# Patient Record
Sex: FEMALE | Race: WHITE | NOT HISPANIC OR LATINO | Employment: FULL TIME | URBAN - METROPOLITAN AREA
[De-identification: names, ages, dates, MRNs, and addresses within clinical notes are randomized per-mention and may not be internally consistent; named-entity substitution may affect disease eponyms.]

---

## 2021-03-30 ENCOUNTER — HOSPITAL ENCOUNTER (EMERGENCY)
Facility: HOSPITAL | Age: 61
Discharge: HOME | End: 2021-03-30
Attending: EMERGENCY MEDICINE
Payer: COMMERCIAL

## 2021-03-30 VITALS
SYSTOLIC BLOOD PRESSURE: 163 MMHG | HEART RATE: 76 BPM | RESPIRATION RATE: 18 BRPM | DIASTOLIC BLOOD PRESSURE: 72 MMHG | OXYGEN SATURATION: 96 % | TEMPERATURE: 98 F | WEIGHT: 194.88 LBS

## 2021-03-30 DIAGNOSIS — R10.13 EPIGASTRIC DISCOMFORT: ICD-10-CM

## 2021-03-30 DIAGNOSIS — R10.13 ACUTE EPIGASTRIC PAIN: Primary | ICD-10-CM

## 2021-03-30 DIAGNOSIS — R07.9 CHEST PAIN: ICD-10-CM

## 2021-03-30 LAB
ALBUMIN SERPL BCP-MCNC: 4.1 G/DL (ref 3.5–5.2)
ALP SERPL-CCNC: 55 U/L (ref 55–135)
ALT SERPL W/O P-5'-P-CCNC: 27 U/L (ref 10–44)
ANION GAP SERPL CALC-SCNC: 13 MMOL/L (ref 8–16)
AST SERPL-CCNC: 20 U/L (ref 10–40)
BASOPHILS # BLD AUTO: 0.05 K/UL (ref 0–0.2)
BASOPHILS NFR BLD: 0.4 % (ref 0–1.9)
BILIRUB SERPL-MCNC: 0.3 MG/DL (ref 0.1–1)
BUN SERPL-MCNC: 14 MG/DL (ref 8–23)
CALCIUM SERPL-MCNC: 8.9 MG/DL (ref 8.7–10.5)
CHLORIDE SERPL-SCNC: 101 MMOL/L (ref 95–110)
CO2 SERPL-SCNC: 26 MMOL/L (ref 23–29)
CREAT SERPL-MCNC: 0.8 MG/DL (ref 0.5–1.4)
DIFFERENTIAL METHOD: ABNORMAL
EOSINOPHIL # BLD AUTO: 0.1 K/UL (ref 0–0.5)
EOSINOPHIL NFR BLD: 0.5 % (ref 0–8)
ERYTHROCYTE [DISTWIDTH] IN BLOOD BY AUTOMATED COUNT: 12.8 % (ref 11.5–14.5)
EST. GFR  (AFRICAN AMERICAN): >60 ML/MIN/1.73 M^2
EST. GFR  (NON AFRICAN AMERICAN): >60 ML/MIN/1.73 M^2
GLUCOSE SERPL-MCNC: 131 MG/DL (ref 70–110)
HCT VFR BLD AUTO: 39.4 % (ref 37–48.5)
HGB BLD-MCNC: 13.1 G/DL (ref 12–16)
IMM GRANULOCYTES # BLD AUTO: 0.02 K/UL (ref 0–0.04)
IMM GRANULOCYTES NFR BLD AUTO: 0.2 % (ref 0–0.5)
LIPASE SERPL-CCNC: 18 U/L (ref 4–60)
LYMPHOCYTES # BLD AUTO: 1.8 K/UL (ref 1–4.8)
LYMPHOCYTES NFR BLD: 14.6 % (ref 18–48)
MCH RBC QN AUTO: 30.7 PG (ref 27–31)
MCHC RBC AUTO-ENTMCNC: 33.2 G/DL (ref 32–36)
MCV RBC AUTO: 92 FL (ref 82–98)
MONOCYTES # BLD AUTO: 0.5 K/UL (ref 0.3–1)
MONOCYTES NFR BLD: 4 % (ref 4–15)
NEUTROPHILS # BLD AUTO: 10.1 K/UL (ref 1.8–7.7)
NEUTROPHILS NFR BLD: 80.3 % (ref 38–73)
NRBC BLD-RTO: 0 /100 WBC
PLATELET # BLD AUTO: 238 K/UL (ref 150–450)
PMV BLD AUTO: 10.7 FL (ref 9.2–12.9)
POTASSIUM SERPL-SCNC: 3.4 MMOL/L (ref 3.5–5.1)
PROT SERPL-MCNC: 7.5 G/DL (ref 6–8.4)
RBC # BLD AUTO: 4.27 M/UL (ref 4–5.4)
SODIUM SERPL-SCNC: 140 MMOL/L (ref 136–145)
TROPONIN I SERPL DL<=0.01 NG/ML-MCNC: <0.006 NG/ML (ref 0–0.03)
TROPONIN I SERPL DL<=0.01 NG/ML-MCNC: <0.006 NG/ML (ref 0–0.03)
WBC # BLD AUTO: 12.6 K/UL (ref 3.9–12.7)

## 2021-03-30 PROCEDURE — 85025 COMPLETE CBC W/AUTO DIFF WBC: CPT | Mod: ER | Performed by: EMERGENCY MEDICINE

## 2021-03-30 PROCEDURE — 80053 COMPREHEN METABOLIC PANEL: CPT | Mod: ER | Performed by: EMERGENCY MEDICINE

## 2021-03-30 PROCEDURE — 25000003 PHARM REV CODE 250: Mod: ER | Performed by: EMERGENCY MEDICINE

## 2021-03-30 PROCEDURE — 93005 ELECTROCARDIOGRAM TRACING: CPT | Mod: ER

## 2021-03-30 PROCEDURE — 83690 ASSAY OF LIPASE: CPT | Mod: ER | Performed by: EMERGENCY MEDICINE

## 2021-03-30 PROCEDURE — 99285 EMERGENCY DEPT VISIT HI MDM: CPT | Mod: 25,ER

## 2021-03-30 PROCEDURE — 93010 EKG 12-LEAD: ICD-10-PCS | Mod: ,,, | Performed by: INTERNAL MEDICINE

## 2021-03-30 PROCEDURE — 84484 ASSAY OF TROPONIN QUANT: CPT | Mod: ER | Performed by: EMERGENCY MEDICINE

## 2021-03-30 PROCEDURE — 93010 ELECTROCARDIOGRAM REPORT: CPT | Mod: ,,, | Performed by: INTERNAL MEDICINE

## 2021-03-30 PROCEDURE — 84484 ASSAY OF TROPONIN QUANT: CPT | Mod: 91,ER | Performed by: EMERGENCY MEDICINE

## 2021-03-30 RX ORDER — HYDROCHLOROTHIAZIDE 25 MG/1
TABLET ORAL
COMMUNITY
Start: 2020-04-29 | End: 2023-06-29

## 2021-03-30 RX ORDER — MAG HYDROX/ALUMINUM HYD/SIMETH 200-200-20
30 SUSPENSION, ORAL (FINAL DOSE FORM) ORAL
Status: COMPLETED | OUTPATIENT
Start: 2021-03-30 | End: 2021-03-30

## 2021-03-30 RX ORDER — AMLODIPINE BESYLATE 5 MG/1
TABLET ORAL
COMMUNITY
Start: 2020-10-21 | End: 2023-07-13

## 2021-03-30 RX ORDER — LIDOCAINE HYDROCHLORIDE 20 MG/ML
5 SOLUTION OROPHARYNGEAL
Status: COMPLETED | OUTPATIENT
Start: 2021-03-30 | End: 2021-03-30

## 2021-03-30 RX ORDER — PANTOPRAZOLE SODIUM 40 MG/1
40 TABLET, DELAYED RELEASE ORAL DAILY
Qty: 30 TABLET | Refills: 0 | Status: SHIPPED | OUTPATIENT
Start: 2021-03-30 | End: 2023-06-29

## 2021-03-30 RX ADMIN — MAGNESIUM HYDROXIDE/ALUMINUM HYDROXICE/SIMETHICONE 30 ML: 120; 1200; 1200 SUSPENSION ORAL at 05:03

## 2021-03-30 RX ADMIN — LIDOCAINE HYDROCHLORIDE 5 ML: 20 SOLUTION ORAL; TOPICAL at 05:03

## 2023-01-19 ENCOUNTER — HOSPITAL ENCOUNTER (EMERGENCY)
Facility: HOSPITAL | Age: 63
Discharge: HOME | End: 2023-01-19
Attending: EMERGENCY MEDICINE
Payer: COMMERCIAL

## 2023-01-19 VITALS
WEIGHT: 173.81 LBS | SYSTOLIC BLOOD PRESSURE: 151 MMHG | RESPIRATION RATE: 18 BRPM | BODY MASS INDEX: 30.8 KG/M2 | HEIGHT: 63 IN | OXYGEN SATURATION: 100 % | TEMPERATURE: 98 F | HEART RATE: 63 BPM | DIASTOLIC BLOOD PRESSURE: 63 MMHG

## 2023-01-19 DIAGNOSIS — N30.01 ACUTE CYSTITIS WITH HEMATURIA: ICD-10-CM

## 2023-01-19 DIAGNOSIS — R53.1 WEAKNESS: ICD-10-CM

## 2023-01-19 DIAGNOSIS — E87.6 HYPOKALEMIA: Primary | ICD-10-CM

## 2023-01-19 LAB
ALBUMIN SERPL BCP-MCNC: 4.2 G/DL (ref 3.5–5.2)
ALP SERPL-CCNC: 57 U/L (ref 55–135)
ALT SERPL W/O P-5'-P-CCNC: 43 U/L (ref 10–44)
ANION GAP SERPL CALC-SCNC: 15 MMOL/L (ref 8–16)
AST SERPL-CCNC: 33 U/L (ref 10–40)
BACTERIA #/AREA URNS AUTO: ABNORMAL /HPF
BASOPHILS # BLD AUTO: 0.06 K/UL (ref 0–0.2)
BASOPHILS NFR BLD: 0.7 % (ref 0–1.9)
BILIRUB SERPL-MCNC: 0.6 MG/DL (ref 0.1–1)
BILIRUB UR QL STRIP: ABNORMAL
BNP SERPL-MCNC: 12 PG/ML (ref 0–99)
BUN SERPL-MCNC: 9 MG/DL (ref 8–23)
CALCIUM SERPL-MCNC: 9.9 MG/DL (ref 8.7–10.5)
CHLORIDE SERPL-SCNC: 98 MMOL/L (ref 95–110)
CK SERPL-CCNC: 50 U/L (ref 20–180)
CLARITY UR REFRACT.AUTO: ABNORMAL
CO2 SERPL-SCNC: 24 MMOL/L (ref 23–29)
COLOR UR AUTO: YELLOW
CREAT SERPL-MCNC: 1.1 MG/DL (ref 0.5–1.4)
CTP QC/QA: YES
CTP QC/QA: YES
DIFFERENTIAL METHOD: NORMAL
EOSINOPHIL # BLD AUTO: 0 K/UL (ref 0–0.5)
EOSINOPHIL NFR BLD: 0.5 % (ref 0–8)
ERYTHROCYTE [DISTWIDTH] IN BLOOD BY AUTOMATED COUNT: 12.2 % (ref 11.5–14.5)
EST. GFR  (NO RACE VARIABLE): 56.1 ML/MIN/1.73 M^2
GLUCOSE SERPL-MCNC: 131 MG/DL (ref 70–110)
GLUCOSE UR QL STRIP: NEGATIVE
HCT VFR BLD AUTO: 44 % (ref 37–48.5)
HCV AB SERPL QL IA: NEGATIVE
HEP C VIRUS HOLD SPECIMEN: NORMAL
HGB BLD-MCNC: 15.1 G/DL (ref 12–16)
HGB UR QL STRIP: ABNORMAL
HIV 1+2 AB+HIV1 P24 AG SERPL QL IA: NEGATIVE
HYALINE CASTS UR QL AUTO: 1 /LPF
IMM GRANULOCYTES # BLD AUTO: 0.02 K/UL (ref 0–0.04)
IMM GRANULOCYTES NFR BLD AUTO: 0.2 % (ref 0–0.5)
KETONES UR QL STRIP: NEGATIVE
LEUKOCYTE ESTERASE UR QL STRIP: ABNORMAL
LYMPHOCYTES # BLD AUTO: 2.4 K/UL (ref 1–4.8)
LYMPHOCYTES NFR BLD: 26.9 % (ref 18–48)
MAGNESIUM SERPL-MCNC: 1.7 MG/DL (ref 1.6–2.6)
MCH RBC QN AUTO: 29.5 PG (ref 27–31)
MCHC RBC AUTO-ENTMCNC: 34.3 G/DL (ref 32–36)
MCV RBC AUTO: 86 FL (ref 82–98)
MICROSCOPIC COMMENT: ABNORMAL
MONOCYTES # BLD AUTO: 0.5 K/UL (ref 0.3–1)
MONOCYTES NFR BLD: 5.9 % (ref 4–15)
NEUTROPHILS # BLD AUTO: 5.8 K/UL (ref 1.8–7.7)
NEUTROPHILS NFR BLD: 65.8 % (ref 38–73)
NITRITE UR QL STRIP: POSITIVE
NRBC BLD-RTO: 0 /100 WBC
PH UR STRIP: 7 [PH] (ref 5–8)
PLATELET # BLD AUTO: 318 K/UL (ref 150–450)
PMV BLD AUTO: 10.3 FL (ref 9.2–12.9)
POC MOLECULAR INFLUENZA A AGN: NEGATIVE
POC MOLECULAR INFLUENZA B AGN: NEGATIVE
POCT GLUCOSE: 124 MG/DL (ref 70–110)
POTASSIUM SERPL-SCNC: 2.7 MMOL/L (ref 3.5–5.1)
PROT SERPL-MCNC: 8.1 G/DL (ref 6–8.4)
PROT UR QL STRIP: ABNORMAL
RBC # BLD AUTO: 5.12 M/UL (ref 4–5.4)
RBC #/AREA URNS AUTO: 2 /HPF (ref 0–4)
SARS-COV-2 RDRP RESP QL NAA+PROBE: NEGATIVE
SODIUM SERPL-SCNC: 137 MMOL/L (ref 136–145)
SP GR UR STRIP: 1.01 (ref 1–1.03)
TROPONIN I SERPL DL<=0.01 NG/ML-MCNC: <0.006 NG/ML (ref 0–0.03)
URN SPEC COLLECT METH UR: ABNORMAL
UROBILINOGEN UR STRIP-ACNC: NEGATIVE EU/DL
WBC # BLD AUTO: 8.76 K/UL (ref 3.9–12.7)
WBC #/AREA URNS AUTO: 7 /HPF (ref 0–5)

## 2023-01-19 PROCEDURE — 81000 URINALYSIS NONAUTO W/SCOPE: CPT | Mod: ER | Performed by: EMERGENCY MEDICINE

## 2023-01-19 PROCEDURE — 84484 ASSAY OF TROPONIN QUANT: CPT | Mod: ER | Performed by: EMERGENCY MEDICINE

## 2023-01-19 PROCEDURE — 93010 EKG 12-LEAD: ICD-10-PCS | Mod: ,,, | Performed by: INTERNAL MEDICINE

## 2023-01-19 PROCEDURE — 86803 HEPATITIS C AB TEST: CPT | Performed by: EMERGENCY MEDICINE

## 2023-01-19 PROCEDURE — 99285 EMERGENCY DEPT VISIT HI MDM: CPT | Mod: 25,ER

## 2023-01-19 PROCEDURE — 80053 COMPREHEN METABOLIC PANEL: CPT | Mod: ER | Performed by: EMERGENCY MEDICINE

## 2023-01-19 PROCEDURE — 93010 ELECTROCARDIOGRAM REPORT: CPT | Mod: ,,, | Performed by: INTERNAL MEDICINE

## 2023-01-19 PROCEDURE — 87389 HIV-1 AG W/HIV-1&-2 AB AG IA: CPT | Performed by: EMERGENCY MEDICINE

## 2023-01-19 PROCEDURE — 25000003 PHARM REV CODE 250: Mod: ER | Performed by: EMERGENCY MEDICINE

## 2023-01-19 PROCEDURE — 82550 ASSAY OF CK (CPK): CPT | Mod: ER | Performed by: EMERGENCY MEDICINE

## 2023-01-19 PROCEDURE — 82962 GLUCOSE BLOOD TEST: CPT | Mod: ER

## 2023-01-19 PROCEDURE — 83880 ASSAY OF NATRIURETIC PEPTIDE: CPT | Mod: ER | Performed by: EMERGENCY MEDICINE

## 2023-01-19 PROCEDURE — 83735 ASSAY OF MAGNESIUM: CPT | Mod: ER | Performed by: EMERGENCY MEDICINE

## 2023-01-19 PROCEDURE — 87635 SARS-COV-2 COVID-19 AMP PRB: CPT | Mod: ER | Performed by: EMERGENCY MEDICINE

## 2023-01-19 PROCEDURE — 93005 ELECTROCARDIOGRAM TRACING: CPT | Mod: ER

## 2023-01-19 PROCEDURE — 85025 COMPLETE CBC W/AUTO DIFF WBC: CPT | Mod: ER | Performed by: EMERGENCY MEDICINE

## 2023-01-19 PROCEDURE — 87502 INFLUENZA DNA AMP PROBE: CPT | Mod: ER

## 2023-01-19 RX ORDER — CEFUROXIME AXETIL 500 MG/1
500 TABLET ORAL 2 TIMES DAILY
Qty: 20 TABLET | Refills: 0 | Status: SHIPPED | OUTPATIENT
Start: 2023-01-19 | End: 2023-01-29

## 2023-01-19 RX ORDER — ORAL SEMAGLUTIDE 3 MG/1
12 TABLET ORAL DAILY
COMMUNITY
End: 2023-04-06

## 2023-01-19 RX ORDER — ORAL SEMAGLUTIDE 14 MG/1
12 TABLET ORAL EVERY MORNING
COMMUNITY
Start: 2022-11-21 | End: 2023-04-06

## 2023-01-19 RX ORDER — ERGOCALCIFEROL 1.25 MG/1
50000 CAPSULE ORAL
COMMUNITY
Start: 2022-11-05 | End: 2023-04-17

## 2023-01-19 RX ORDER — POTASSIUM CHLORIDE 20 MEQ/1
40 TABLET, EXTENDED RELEASE ORAL
Status: COMPLETED | OUTPATIENT
Start: 2023-01-19 | End: 2023-01-19

## 2023-01-19 RX ADMIN — POTASSIUM CHLORIDE 40 MEQ: 1500 TABLET, EXTENDED RELEASE ORAL at 11:01

## 2023-01-19 NOTE — ED PROVIDER NOTES
Encounter Date: 1/19/2023       History     Chief Complaint   Patient presents with    Weakness     Weakness, onset yesterday, pt states it feels like her blood sugar is low, taking weight loss medication      The history is provided by the patient.   Emesis   This is a new problem. The current episode started yesterday. The problem occurs 2 - 4 times per day. The problem has been resolved. The emesis has an appearance of stomach contents. Pertinent negatives include no fever. Risk factors include ill contacts.   Review of patient's allergies indicates:  No Known Allergies  Past Medical History:   Diagnosis Date    Hypertension      Past Surgical History:   Procedure Laterality Date    EYE SURGERY       History reviewed. No pertinent family history.  Social History     Tobacco Use    Smoking status: Never    Smokeless tobacco: Never   Substance Use Topics    Alcohol use: Yes     Comment: seldom    Drug use: Never     Review of Systems   Constitutional:  Positive for fatigue. Negative for fever.   HENT:  Negative for sore throat.    Respiratory:  Negative for shortness of breath.    Cardiovascular:  Negative for chest pain.   Gastrointestinal:  Positive for nausea and vomiting.   Genitourinary:  Negative for dysuria.   Musculoskeletal:  Negative for back pain.   Skin:  Negative for rash.   Neurological:  Positive for weakness.   Hematological:  Does not bruise/bleed easily.     Physical Exam     Initial Vitals [01/19/23 0929]   BP Pulse Resp Temp SpO2   (!) 159/81 70 18 97.9 °F (36.6 °C) 98 %      MAP       --         @VSF@  Physical Exam    Nursing note and vitals reviewed.  Constitutional: She appears well-developed and well-nourished. No distress.   HENT:   Head: Normocephalic and atraumatic.   Mouth/Throat: Oropharynx is clear and moist.   Eyes: Conjunctivae and EOM are normal. Pupils are equal, round, and reactive to light.   Neck: Neck supple.   Normal range of motion.  Cardiovascular:  Normal rate, regular  rhythm and normal heart sounds.     Exam reveals no gallop and no friction rub.       No murmur heard.  Pulmonary/Chest: Breath sounds normal. No respiratory distress. She has no wheezes. She has no rhonchi. She has no rales.   Abdominal: Abdomen is soft. Bowel sounds are normal. She exhibits no distension and no mass. There is no abdominal tenderness. There is no rebound and no guarding.   Musculoskeletal:         General: No tenderness or edema. Normal range of motion.      Cervical back: Normal range of motion and neck supple.     Neurological: She is alert and oriented to person, place, and time. She has normal strength.   Skin: Skin is warm and dry. No rash noted.   Psychiatric: She has a normal mood and affect. Thought content normal.       ED Course   Procedures               History Acquisition  Additional history was not acquired from other historians.  The patient's list of activation medications and allergies as documented per RN staff has been reviewed.     Differential Diagnoses  Based on available information and the initial assessment, the working differential diagnoses considered during this evaluation include but are not limited to .    Review of Investigative Studies  All results from the workup were reviewed with the patient/family in detail.      Labs:  Labs Reviewed   COMPREHENSIVE METABOLIC PANEL - Abnormal; Notable for the following components:       Result Value    Potassium 2.7 (*)     Glucose 131 (*)     eGFR 56.1 (*)     All other components within normal limits    Narrative:     Release to patient->Immediate    Potassium critical result(s) called and verbal readback obtained   from Felicita Diaz RN by ELSY 01/19/2023 10:47   URINALYSIS, REFLEX TO URINE CULTURE - Abnormal; Notable for the following components:    Appearance, UA Cloudy (*)     Protein, UA 1+ (*)     Bilirubin (UA) 1+ (*)     Occult Blood UA 2+ (*)     Nitrite, UA Positive (*)     Leukocytes, UA 1+ (*)     All other  components within normal limits    Narrative:     Specimen Source->Urine   URINALYSIS MICROSCOPIC - Abnormal; Notable for the following components:    WBC, UA 7 (*)     Bacteria Many (*)     All other components within normal limits    Narrative:     Specimen Source->Urine   POCT GLUCOSE - Abnormal; Notable for the following components:    POCT Glucose 124 (*)     All other components within normal limits   CBC W/ AUTO DIFFERENTIAL    Narrative:     Release to patient->Immediate   B-TYPE NATRIURETIC PEPTIDE    Narrative:     Release to patient->Immediate   CK    Narrative:     Release to patient->Immediate   TROPONIN I    Narrative:     Release to patient->Immediate   MAGNESIUM   MAGNESIUM    Narrative:     Release to patient->Immediate   HIV 1 / 2 ANTIBODY   HEPATITIS C ANTIBODY   HEP C VIRUS HOLD SPECIMEN   SARS-COV-2 RDRP GENE   POCT INFLUENZA A/B MOLECULAR     Notable findings from our independent review of the labs ordered include potassium of 2.7.    Imaging:  Imaging Results              X-Ray Chest AP Portable (Final result)  Result time 01/19/23 09:59:08      Final result by Vu Iyer MD (01/19/23 09:59:08)                   Impression:      No acute abnormality.  Stable exam.      Electronically signed by: Vu Iyer  Date:    01/19/2023  Time:    09:59               Narrative:    EXAMINATION:  XR CHEST AP PORTABLE    CLINICAL HISTORY:  weakness;    TECHNIQUE:  Single frontal view of the chest was performed.    COMPARISON:  None    03/30/2021 chest radiograph    FINDINGS:  The lungs are clear, with normal appearance of pulmonary vasculature and no pleural effusion or pneumothorax.    The cardiac silhouette is normal in size. The hilar and mediastinal contours are unremarkable.    Bones are intact.                                      A radiology report was available for my review at the time of the encounter.    EKG:  EKG Readings: (Independently Interpreted)   Rhythm: Normal Sinus Rhythm.  Heart Rate: 88. Ectopy: No Ectopy. Conduction: Normal. ST Segments: Normal ST Segments. T Waves: Normal. Clinical Impression: Normal Sinus Rhythm  Please refer to the above for independent EKG interpretation.    Additional testing Magnesium level was considered during the course of this workup.    Additional Consideration:  Comorbidities taken into consideration during the patient's evaluation and treatment include Hypertension and diuretic use.    Social determinants of health taken into consideration during development of our treatment plan include affording meds.    Medications   potassium chloride SA CR tablet 40 mEq (40 mEq Oral Given 1/19/23 1108)      Medical Decision Making:   Initial Assessment:   Weakness since yesterday  Differential Diagnosis:   Anemia, electrolyte abnormality  Clinical Tests:   Lab Tests: Ordered and Reviewed  The following lab test(s) were unremarkable: CBC and CMP  Radiological Study: Ordered and Reviewed  ED Management:  Hypokalemia secondary to diuretic use.  Will replete in the ED check magnesium level and give Rx for replacement at home          Instruction:  I see no indication of an emergent process beyond that addressed during our encounter but have duly counseled the patient/family regarding the need for prompt follow-up as well as the indications that should prompt immediate return to the emergency room should new or worrisome developments occur.  The patient/family has been provided with verbal and printed direction regarding our final diagnosis(es) as well as instructions regarding use of OTC and/or Rx medications intended to manage the patient's aforementioned conditions including:  ED Prescriptions       Medication Sig Dispense Start Date End Date Auth. Provider    cefUROXime (CEFTIN) 500 MG tablet Take 1 tablet (500 mg total) by mouth 2 (two) times daily. for 10 days 20 tablet 1/19/2023 1/29/2023 Collin Mcelroy MD    potassium bicarbonate (K-LYTE) disintegrating  tablet Take 1 tablet (25 mEq total) by mouth once daily. for 5 days 5 tablet 1/19/2023 1/24/2023 Collin Mcelroy MD          Patient has been advised of following recommended follow-up instructions:  Follow-up Information       Follow up With Specialties Details Why Contact Info    Jonh Abreu NP Family Medicine   [address] [place] [address]  [place] [address]  [phone]            The patient/family communicates understanding of all this information and all remaining questions and concerns were addressed at this time.      The patient's condition did not warrant review of the  and prescription of controlled substances.        Clinical Impression:     1. Hypokalemia    2. Weakness    3. Acute cystitis with hematuria         ED Disposition Condition    Discharge Stable               Collin Mcelroy MD  01/19/23 0206

## 2023-01-19 NOTE — Clinical Note
"Julio Browne (Terry) was seen and treated in our emergency department on 1/19/2023.  She may return to work on 01/20/2023.       If you have any questions or concerns, please don't hesitate to call.       RN    "

## 2023-04-06 ENCOUNTER — OFFICE VISIT (OUTPATIENT)
Dept: INTERNAL MEDICINE | Facility: CLINIC | Age: 63
End: 2023-04-06
Payer: MEDICARE

## 2023-04-06 VITALS
BODY MASS INDEX: 32.5 KG/M2 | DIASTOLIC BLOOD PRESSURE: 58 MMHG | HEIGHT: 63 IN | SYSTOLIC BLOOD PRESSURE: 110 MMHG | HEART RATE: 88 BPM | TEMPERATURE: 98 F | OXYGEN SATURATION: 96 % | WEIGHT: 183.44 LBS

## 2023-04-06 DIAGNOSIS — Z12.31 ENCOUNTER FOR SCREENING MAMMOGRAM FOR BREAST CANCER: ICD-10-CM

## 2023-04-06 DIAGNOSIS — E66.09 CLASS 1 OBESITY DUE TO EXCESS CALORIES WITH BODY MASS INDEX (BMI) OF 32.0 TO 32.9 IN ADULT, UNSPECIFIED WHETHER SERIOUS COMORBIDITY PRESENT: ICD-10-CM

## 2023-04-06 DIAGNOSIS — Z13.820 ENCOUNTER FOR SCREENING FOR OSTEOPOROSIS: ICD-10-CM

## 2023-04-06 DIAGNOSIS — Z12.11 SCREENING FOR COLON CANCER: ICD-10-CM

## 2023-04-06 DIAGNOSIS — Z00.00 ENCOUNTER FOR MEDICAL EXAMINATION TO ESTABLISH CARE: Primary | ICD-10-CM

## 2023-04-06 DIAGNOSIS — Z78.0 ASYMPTOMATIC MENOPAUSAL STATE: ICD-10-CM

## 2023-04-06 DIAGNOSIS — Z00.00 HEALTH CARE MAINTENANCE: ICD-10-CM

## 2023-04-06 DIAGNOSIS — I10 PRIMARY HYPERTENSION: ICD-10-CM

## 2023-04-06 DIAGNOSIS — R73.9 HYPERGLYCEMIA: ICD-10-CM

## 2023-04-06 DIAGNOSIS — R79.9 ABNORMAL FINDING OF BLOOD CHEMISTRY, UNSPECIFIED: ICD-10-CM

## 2023-04-06 PROCEDURE — G0009 PNEUMOCOCCAL CONJUGATE VACCINE 20-VALENT: ICD-10-PCS | Mod: HCNC,S$GLB,, | Performed by: STUDENT IN AN ORGANIZED HEALTH CARE EDUCATION/TRAINING PROGRAM

## 2023-04-06 PROCEDURE — 1159F MED LIST DOCD IN RCRD: CPT | Mod: HCNC,CPTII,S$GLB, | Performed by: STUDENT IN AN ORGANIZED HEALTH CARE EDUCATION/TRAINING PROGRAM

## 2023-04-06 PROCEDURE — 90677 PCV20 VACCINE IM: CPT | Mod: HCNC,S$GLB,, | Performed by: STUDENT IN AN ORGANIZED HEALTH CARE EDUCATION/TRAINING PROGRAM

## 2023-04-06 PROCEDURE — 3008F PR BODY MASS INDEX (BMI) DOCUMENTED: ICD-10-PCS | Mod: HCNC,CPTII,S$GLB, | Performed by: STUDENT IN AN ORGANIZED HEALTH CARE EDUCATION/TRAINING PROGRAM

## 2023-04-06 PROCEDURE — 1159F PR MEDICATION LIST DOCUMENTED IN MEDICAL RECORD: ICD-10-PCS | Mod: HCNC,CPTII,S$GLB, | Performed by: STUDENT IN AN ORGANIZED HEALTH CARE EDUCATION/TRAINING PROGRAM

## 2023-04-06 PROCEDURE — 3078F DIAST BP <80 MM HG: CPT | Mod: HCNC,CPTII,S$GLB, | Performed by: STUDENT IN AN ORGANIZED HEALTH CARE EDUCATION/TRAINING PROGRAM

## 2023-04-06 PROCEDURE — 99999 PR PBB SHADOW E&M-EST. PATIENT-LVL IV: CPT | Mod: PBBFAC,HCNC,, | Performed by: STUDENT IN AN ORGANIZED HEALTH CARE EDUCATION/TRAINING PROGRAM

## 2023-04-06 PROCEDURE — 3078F PR MOST RECENT DIASTOLIC BLOOD PRESSURE < 80 MM HG: ICD-10-PCS | Mod: HCNC,CPTII,S$GLB, | Performed by: STUDENT IN AN ORGANIZED HEALTH CARE EDUCATION/TRAINING PROGRAM

## 2023-04-06 PROCEDURE — 3074F PR MOST RECENT SYSTOLIC BLOOD PRESSURE < 130 MM HG: ICD-10-PCS | Mod: HCNC,CPTII,S$GLB, | Performed by: STUDENT IN AN ORGANIZED HEALTH CARE EDUCATION/TRAINING PROGRAM

## 2023-04-06 PROCEDURE — G0009 ADMIN PNEUMOCOCCAL VACCINE: HCPCS | Mod: HCNC,S$GLB,, | Performed by: STUDENT IN AN ORGANIZED HEALTH CARE EDUCATION/TRAINING PROGRAM

## 2023-04-06 PROCEDURE — 90677 PNEUMOCOCCAL CONJUGATE VACCINE 20-VALENT: ICD-10-PCS | Mod: HCNC,S$GLB,, | Performed by: STUDENT IN AN ORGANIZED HEALTH CARE EDUCATION/TRAINING PROGRAM

## 2023-04-06 PROCEDURE — 1101F PT FALLS ASSESS-DOCD LE1/YR: CPT | Mod: HCNC,CPTII,S$GLB, | Performed by: STUDENT IN AN ORGANIZED HEALTH CARE EDUCATION/TRAINING PROGRAM

## 2023-04-06 PROCEDURE — 3074F SYST BP LT 130 MM HG: CPT | Mod: HCNC,CPTII,S$GLB, | Performed by: STUDENT IN AN ORGANIZED HEALTH CARE EDUCATION/TRAINING PROGRAM

## 2023-04-06 PROCEDURE — 99203 OFFICE O/P NEW LOW 30 MIN: CPT | Mod: HCNC,S$GLB,, | Performed by: STUDENT IN AN ORGANIZED HEALTH CARE EDUCATION/TRAINING PROGRAM

## 2023-04-06 PROCEDURE — 99203 PR OFFICE/OUTPT VISIT, NEW, LEVL III, 30-44 MIN: ICD-10-PCS | Mod: HCNC,S$GLB,, | Performed by: STUDENT IN AN ORGANIZED HEALTH CARE EDUCATION/TRAINING PROGRAM

## 2023-04-06 PROCEDURE — 99999 PR PBB SHADOW E&M-EST. PATIENT-LVL IV: ICD-10-PCS | Mod: PBBFAC,HCNC,, | Performed by: STUDENT IN AN ORGANIZED HEALTH CARE EDUCATION/TRAINING PROGRAM

## 2023-04-06 PROCEDURE — 3288F PR FALLS RISK ASSESSMENT DOCUMENTED: ICD-10-PCS | Mod: HCNC,CPTII,S$GLB, | Performed by: STUDENT IN AN ORGANIZED HEALTH CARE EDUCATION/TRAINING PROGRAM

## 2023-04-06 PROCEDURE — 1101F PR PT FALLS ASSESS DOC 0-1 FALLS W/OUT INJ PAST YR: ICD-10-PCS | Mod: HCNC,CPTII,S$GLB, | Performed by: STUDENT IN AN ORGANIZED HEALTH CARE EDUCATION/TRAINING PROGRAM

## 2023-04-06 PROCEDURE — 3008F BODY MASS INDEX DOCD: CPT | Mod: HCNC,CPTII,S$GLB, | Performed by: STUDENT IN AN ORGANIZED HEALTH CARE EDUCATION/TRAINING PROGRAM

## 2023-04-06 PROCEDURE — 3288F FALL RISK ASSESSMENT DOCD: CPT | Mod: HCNC,CPTII,S$GLB, | Performed by: STUDENT IN AN ORGANIZED HEALTH CARE EDUCATION/TRAINING PROGRAM

## 2023-04-06 RX ORDER — SEMAGLUTIDE 1.34 MG/ML
INJECTION, SOLUTION SUBCUTANEOUS
Qty: 2.25 ML | Refills: 0 | Status: SHIPPED | OUTPATIENT
Start: 2023-04-06 | End: 2023-05-12 | Stop reason: SDUPTHER

## 2023-04-06 NOTE — PROGRESS NOTES
No chief complaint on file.    HPI: Julio Browne is a 63 y.o. female  with Pmhx listed below who presents to clinic for establishing care. She reports to be doing well. Denies having any shortness of breath, chest pain, abdominal pain, palpitation, leg swelling, syncopal episode, nausea, vomiting, fever and other complains at present. Medication list has been reviewed and updated along with her. She reports to be compliant with her medication and has no issues taking it.   She is a non smoker, drinks wine socially and denied taking drugs. Family history is significant for lung cancer and colon cancer to her grandparents diagnosed at 70's. She denied having any allergies that is known to her.  She has not had colonoscopy done yet, nor had she had mammogram or dexa scan done today. Lab work to be repeated today.  Problem List:  Patient Active Problem List   Diagnosis    Hypertension     ROS: Negative except as noted above.     Current Meds:  Current Outpatient Medications   Medication Sig Dispense Refill    amLODIPine (NORVASC) 5 MG tablet amlodipine Take (oral) 20201021 tablet No frequency recorded oral No set duration recorded No set duration amount recorded active 5 mg      ergocalciferol (ERGOCALCIFEROL) 50,000 unit Cap Take 50,000 Units by mouth every 7 days.      hydroCHLOROthiazide (HYDRODIURIL) 25 MG tablet hydrochlorothiazide Take (oral) 20200429 tablet No frequency recorded oral No set duration recorded No set duration amount recorded active 25 mg      pantoprazole (PROTONIX) 40 MG tablet Take 1 tablet (40 mg total) by mouth once daily. 30 tablet 0    RYBELSUS 14 mg tablet Take 3 mg by mouth every morning.      semaglutide (RYBELSUS) 3 mg tablet Take 3 mg by mouth once daily. 6mg daily       No current facility-administered medications for this visit.      PE:                There is no height or weight on file to calculate BMI.    Wt Readings from Last 5 Encounters:   01/19/23 78.8 kg (173 lb 13.3 oz)    03/30/21 88.4 kg (194 lb 14.2 oz)     General appearance: alert and cooperative, not in acute distress  Head: normocephalic, without obvious abnormality, atraumatic  Eyes: conjunctivae/corneas clear. PERRL, EOM's intact.  Ears: clear tympanic membranes   Neck: no adenopathy, supple, symmetrical, trachea midline and thyroid not enlarged, symmetric, no tenderness/mass/nodules, no JVD  Throat: lips, mucosa, and tongue normal; teeth and gums normal; no thrush  Chest: no reproducible chest pain   Heart: regular rate and rhythm, S1, S2 normal, no murmur, click, rub or gallop  Lungs: unlabored respiration, bilateral equal air entry, normal vesicular breath sound heard, no wheezing, rhonchi   Abdomen: soft, non-tender, non-distended; bowel sounds +; no masses,  no organomegaly, no ascites   Extremities: normal, atraumatic, no cyanosis or edema noted B/L upper and lower extremities.  Skin: skin color, texture, turgor normal. No rashes or lesions noted.  Neurologic: grossly intact        Lab:  Lab Results   Component Value Date    WBC 8.76 01/19/2023    HGB 15.1 01/19/2023    HCT 44.0 01/19/2023    MCV 86 01/19/2023     01/19/2023     01/19/2023    K 2.7 (LL) 01/19/2023    CL 98 01/19/2023    CO2 24 01/19/2023    BUN 9 01/19/2023     (H) 01/19/2023    CALCIUM 9.9 01/19/2023    MG 1.7 01/19/2023    AST 33 01/19/2023    ALT 43 01/19/2023       Impression:    ICD-10-CM ICD-9-CM    1. Encounter for medical examination to establish care  Z00.00 V70.9       2. Screening for colon cancer  Z12.11 V76.51       3. Encounter for screening for osteoporosis  Z13.820 V82.81       4. Encounter for screening mammogram for breast cancer  Z12.31 V76.12       5. Primary hypertension  I10 401.9       6. Hyperglycemia  R73.9 790.29         1. Encounter for medical examination to establish care  - Lipid Panel; Future  - COMPREHENSIVE METABOLIC PANEL; Future  - HEMOGLOBIN A1C; Future  - DXA Bone Density Axial Skeleton 1 or  more sites; Future    2. Screening for colon cancer  -- Cologuard Screening (Multitarget Stool DNA); Future  - Cologuard Screening (Multitarget Stool DNA)    3. Encounter for screening for osteoporosis  Dexa scan to be scheduled    4. Encounter for screening mammogram for breast cancer  - Mammo Digital Screening Bilat w/ Ricky; Future    5. Primary hypertension  - normotensive  - continue hctz and Norvasc    6. Hyperglycemia  -    7. Health care maintenance  - (In Office Administered) Pneumococcal Conjugate Vaccine (20 Valent) (IM)    8. Abnormal finding of blood chemistry, unspecified    - Lipid Panel; Future  - HEMOGLOBIN A1C; Future    9. Asymptomatic menopausal state  - DXA Bone Density Axial Skeleton 1 or more sites; Future    10. Class 1 obesity due to excess calories with body mass index (BMI) of 32.0 to 32.9 in adult, unspecified whether serious comorbidity present  - semaglutide (OZEMPIC) 0.25 mg or 0.5 mg(2 mg/1.5 mL) pen injector; Inject 0.25 mg into the skin every 7 days for 30 days, THEN 0.5 mg every 7 days.  Dispense: 2.25 mL; Refill: 0    Future Appointments   Date Time Provider Department Center   4/6/2023  4:20 PM Gianfranco Amanda MD [facility]  Kade ANDREWS spent a total of 32 minutes on the day of the visit.This includes face to face time and non-face to face time preparing to see the patient (eg, review of tests), obtaining and/or reviewing separately obtained history, documenting clinical information in the electronic or other health record, independently interpreting results and communicating results to the patient/family/caregiver, or care coordinator.    Gianfranco Amanda MD

## 2023-04-10 ENCOUNTER — HOSPITAL ENCOUNTER (OUTPATIENT)
Dept: RADIOLOGY | Facility: HOSPITAL | Age: 63
Discharge: HOME | End: 2023-04-10
Attending: STUDENT IN AN ORGANIZED HEALTH CARE EDUCATION/TRAINING PROGRAM
Payer: MEDICARE

## 2023-04-10 ENCOUNTER — TELEPHONE (OUTPATIENT)
Dept: INTERNAL MEDICINE | Facility: CLINIC | Age: 63
End: 2023-04-10
Payer: MEDICARE

## 2023-04-10 VITALS — HEIGHT: 63 IN | BODY MASS INDEX: 32.5 KG/M2 | WEIGHT: 183.44 LBS

## 2023-04-10 DIAGNOSIS — Z12.31 ENCOUNTER FOR SCREENING MAMMOGRAM FOR BREAST CANCER: ICD-10-CM

## 2023-04-10 PROCEDURE — 77067 SCR MAMMO BI INCL CAD: CPT | Mod: TC,HCNC,PO

## 2023-04-10 PROCEDURE — 77067 SCR MAMMO BI INCL CAD: CPT | Mod: 26,HCNC,, | Performed by: RADIOLOGY

## 2023-04-10 PROCEDURE — 77067 MAMMO DIGITAL SCREENING BILAT WITH TOMO: ICD-10-PCS | Mod: 26,HCNC,, | Performed by: RADIOLOGY

## 2023-04-10 PROCEDURE — 77063 MAMMO DIGITAL SCREENING BILAT WITH TOMO: ICD-10-PCS | Mod: 26,HCNC,, | Performed by: RADIOLOGY

## 2023-04-10 PROCEDURE — 77063 BREAST TOMOSYNTHESIS BI: CPT | Mod: 26,HCNC,, | Performed by: RADIOLOGY

## 2023-04-10 NOTE — TELEPHONE ENCOUNTER
[employer] was called regarding to [employer] orders for patient. Explains order have not ship due to the  ICD code at [phone] REFERENCE [id number] as requested. Correct ICD codes were obtained and given to Dr Amanda for correction.

## 2023-04-11 ENCOUNTER — TELEPHONE (OUTPATIENT)
Dept: RADIOLOGY | Facility: HOSPITAL | Age: 63
End: 2023-04-11
Payer: MEDICARE

## 2023-04-12 ENCOUNTER — TELEPHONE (OUTPATIENT)
Dept: INTERNAL MEDICINE | Facility: CLINIC | Age: 63
End: 2023-04-12
Payer: MEDICARE

## 2023-04-12 NOTE — TELEPHONE ENCOUNTER
Called [phone] and spoke to Shaun to obtain the correct ICD-9 codes. Information passed on to Dr Amanda.

## 2023-04-13 ENCOUNTER — APPOINTMENT (OUTPATIENT)
Dept: RADIOLOGY | Facility: HOSPITAL | Age: 63
End: 2023-04-13
Attending: STUDENT IN AN ORGANIZED HEALTH CARE EDUCATION/TRAINING PROGRAM
Payer: MEDICARE

## 2023-04-13 DIAGNOSIS — M85.851 OSTEOPENIA OF NECK OF RIGHT FEMUR: Primary | ICD-10-CM

## 2023-04-13 DIAGNOSIS — Z13.820 ENCOUNTER FOR SCREENING FOR OSTEOPOROSIS: ICD-10-CM

## 2023-04-13 DIAGNOSIS — Z78.0 ASYMPTOMATIC MENOPAUSAL STATE: ICD-10-CM

## 2023-04-13 PROCEDURE — 77080 DXA BONE DENSITY AXIAL SKELETON 1 OR MORE SITES: ICD-10-PCS | Mod: 26,HCNC,, | Performed by: RADIOLOGY

## 2023-04-13 PROCEDURE — 99499 NO LOS: ICD-10-PCS | Mod: HCNC,,, | Performed by: STUDENT IN AN ORGANIZED HEALTH CARE EDUCATION/TRAINING PROGRAM

## 2023-04-13 PROCEDURE — 99499 UNLISTED E&M SERVICE: CPT | Mod: HCNC,,, | Performed by: STUDENT IN AN ORGANIZED HEALTH CARE EDUCATION/TRAINING PROGRAM

## 2023-04-13 PROCEDURE — 77080 DXA BONE DENSITY AXIAL: CPT | Mod: TC,HCNC

## 2023-04-13 PROCEDURE — 77080 DXA BONE DENSITY AXIAL: CPT | Mod: 26,HCNC,, | Performed by: RADIOLOGY

## 2023-04-13 RX ORDER — LYSINE HCL 500 MG
1 TABLET ORAL 2 TIMES DAILY
Qty: 180 TABLET | Refills: 0 | Status: SHIPPED | OUTPATIENT
Start: 2023-04-13 | End: 2023-04-17 | Stop reason: SDUPTHER

## 2023-04-17 DIAGNOSIS — M85.851 OSTEOPENIA OF NECK OF RIGHT FEMUR: Primary | ICD-10-CM

## 2023-04-17 RX ORDER — ERGOCALCIFEROL 1.25 MG/1
50000 CAPSULE ORAL
Status: CANCELLED | OUTPATIENT
Start: 2023-04-17

## 2023-04-17 RX ORDER — LYSINE HCL 500 MG
1 TABLET ORAL 2 TIMES DAILY
Qty: 180 TABLET | Refills: 0 | Status: SHIPPED | OUTPATIENT
Start: 2023-04-17 | End: 2024-03-28

## 2023-04-27 ENCOUNTER — PATIENT MESSAGE (OUTPATIENT)
Dept: ADMINISTRATIVE | Facility: HOSPITAL | Age: 63
End: 2023-04-27
Payer: MEDICARE

## 2023-05-03 ENCOUNTER — PATIENT MESSAGE (OUTPATIENT)
Dept: RESEARCH | Facility: HOSPITAL | Age: 63
End: 2023-05-03
Payer: MEDICARE

## 2023-05-03 LAB — NONINV COLON CA DNA+OCC BLD SCRN STL QL: NEGATIVE

## 2023-05-05 ENCOUNTER — TELEPHONE (OUTPATIENT)
Dept: RADIOLOGY | Facility: HOSPITAL | Age: 63
End: 2023-05-05

## 2023-05-05 ENCOUNTER — HOSPITAL ENCOUNTER (OUTPATIENT)
Dept: RADIOLOGY | Facility: HOSPITAL | Age: 63
Discharge: HOME | End: 2023-05-05
Attending: STUDENT IN AN ORGANIZED HEALTH CARE EDUCATION/TRAINING PROGRAM
Payer: MEDICARE

## 2023-05-05 DIAGNOSIS — R92.8 ABNORMAL MAMMOGRAM: Primary | ICD-10-CM

## 2023-05-05 DIAGNOSIS — R92.8 ABNORMAL MAMMOGRAM: ICD-10-CM

## 2023-05-05 PROCEDURE — 76642 US BREAST RIGHT LIMITED: ICD-10-PCS | Mod: 26,HCNC,RT, | Performed by: RADIOLOGY

## 2023-05-05 PROCEDURE — 77061 BREAST TOMOSYNTHESIS UNI: CPT | Mod: TC,HCNC,RT

## 2023-05-05 PROCEDURE — 76642 ULTRASOUND BREAST LIMITED: CPT | Mod: TC,HCNC,RT

## 2023-05-05 PROCEDURE — 77061 BREAST TOMOSYNTHESIS UNI: CPT | Mod: 26,HCNC,RT, | Performed by: RADIOLOGY

## 2023-05-05 PROCEDURE — 76642 ULTRASOUND BREAST LIMITED: CPT | Mod: 26,HCNC,RT, | Performed by: RADIOLOGY

## 2023-05-05 PROCEDURE — 77065 DX MAMMO INCL CAD UNI: CPT | Mod: 26,HCNC,RT, | Performed by: RADIOLOGY

## 2023-05-05 PROCEDURE — 77061 MAMMO DIGITAL DIAGNOSTIC RIGHT WITH TOMO: ICD-10-PCS | Mod: 26,HCNC,RT, | Performed by: RADIOLOGY

## 2023-05-05 PROCEDURE — 77065 MAMMO DIGITAL DIAGNOSTIC RIGHT WITH TOMO: ICD-10-PCS | Mod: 26,HCNC,RT, | Performed by: RADIOLOGY

## 2023-05-05 NOTE — TELEPHONE ENCOUNTER
Ultrasound guided biopsy scheduled for 5/11/23 at 1pm, arrival time 12:30pm. Biopsy instructions given with understandings verbalized. Patient has my contact information.

## 2023-05-10 DIAGNOSIS — R92.8 ABNORMAL MAMMOGRAM: Primary | ICD-10-CM

## 2023-05-11 ENCOUNTER — HOSPITAL ENCOUNTER (OUTPATIENT)
Dept: RADIOLOGY | Facility: HOSPITAL | Age: 63
Discharge: HOME | End: 2023-05-11
Attending: STUDENT IN AN ORGANIZED HEALTH CARE EDUCATION/TRAINING PROGRAM
Payer: MEDICARE

## 2023-05-11 DIAGNOSIS — R92.8 ABNORMAL MAMMOGRAM: ICD-10-CM

## 2023-05-11 PROCEDURE — 88305 TISSUE EXAM BY PATHOLOGIST: CPT | Mod: HCNC | Performed by: PATHOLOGY

## 2023-05-11 PROCEDURE — 88342 IMHCHEM/IMCYTCHM 1ST ANTB: CPT | Mod: 26,HCNC,, | Performed by: PATHOLOGY

## 2023-05-11 PROCEDURE — 19081 MAMMO BREAST STEREOTACTIC BREAST BIOPSY RIGHT: ICD-10-PCS | Mod: HCNC,RT,, | Performed by: RADIOLOGY

## 2023-05-11 PROCEDURE — 19081 BX BREAST 1ST LESION STRTCTC: CPT | Mod: HCNC,RT,, | Performed by: RADIOLOGY

## 2023-05-11 PROCEDURE — 88305 TISSUE EXAM BY PATHOLOGIST: ICD-10-PCS | Mod: 26,HCNC,, | Performed by: PATHOLOGY

## 2023-05-11 PROCEDURE — 19081 BX BREAST 1ST LESION STRTCTC: CPT | Mod: HCNC,RT

## 2023-05-11 PROCEDURE — 88342 CHG IMMUNOCYTOCHEMISTRY: ICD-10-PCS | Mod: 26,HCNC,, | Performed by: PATHOLOGY

## 2023-05-11 PROCEDURE — 88342 IMHCHEM/IMCYTCHM 1ST ANTB: CPT | Mod: HCNC | Performed by: PATHOLOGY

## 2023-05-11 PROCEDURE — 88305 TISSUE EXAM BY PATHOLOGIST: CPT | Mod: 26,HCNC,, | Performed by: PATHOLOGY

## 2023-05-12 DIAGNOSIS — E66.09 CLASS 1 OBESITY DUE TO EXCESS CALORIES WITH BODY MASS INDEX (BMI) OF 32.0 TO 32.9 IN ADULT, UNSPECIFIED WHETHER SERIOUS COMORBIDITY PRESENT: ICD-10-CM

## 2023-05-12 NOTE — TELEPHONE ENCOUNTER
Refill Routing Note   Medication(s) are not appropriate for processing by [facility] for the following reason(s):      New or recently adjusted medication    [facility] action(s):  Defer None identified          Appointments  past 12m or future 3m with PCP    Date Provider   Last Visit   4/6/2023 Gianfranco Amanda MD   Next Visit   Visit date not found Gianfranco Amanda MD   ED visits in past 90 days: 0        Note composed:10:34 AM 05/12/2023

## 2023-05-15 RX ORDER — SEMAGLUTIDE 1.34 MG/ML
0.5 INJECTION, SOLUTION SUBCUTANEOUS
Qty: 1.5 ML | Refills: 0 | Status: SHIPPED | OUTPATIENT
Start: 2023-05-15 | End: 2023-06-14

## 2023-05-16 LAB
FINAL PATHOLOGIC DIAGNOSIS: NORMAL
GROSS: NORMAL
Lab: NORMAL
MICROSCOPIC EXAM: NORMAL

## 2023-05-17 ENCOUNTER — TELEPHONE (OUTPATIENT)
Dept: SURGERY | Facility: CLINIC | Age: 63
End: 2023-05-17
Payer: MEDICARE

## 2023-05-17 NOTE — TELEPHONE ENCOUNTER
Spoke with patient regarding benign breast results- patient verbalized understanding and denied any further questions or concerns at this time, states that the biopsy site is healing well with no concerns.     ----- Message from Sourav Estevez DO sent at 5/17/2023  9:53 AM CDT -----  Benign and concordant.      Thank you.

## 2023-06-19 ENCOUNTER — HOSPITAL ENCOUNTER (EMERGENCY)
Facility: HOSPITAL | Age: 63
Discharge: HOME | End: 2023-06-19
Attending: EMERGENCY MEDICINE
Payer: MEDICARE

## 2023-06-19 VITALS
RESPIRATION RATE: 17 BRPM | WEIGHT: 174.19 LBS | SYSTOLIC BLOOD PRESSURE: 129 MMHG | DIASTOLIC BLOOD PRESSURE: 68 MMHG | OXYGEN SATURATION: 100 % | BODY MASS INDEX: 30.85 KG/M2 | TEMPERATURE: 98 F | HEART RATE: 72 BPM

## 2023-06-19 DIAGNOSIS — E87.6 HYPOKALEMIA: Primary | ICD-10-CM

## 2023-06-19 DIAGNOSIS — R53.83 FATIGUE: ICD-10-CM

## 2023-06-19 LAB
ALBUMIN SERPL BCP-MCNC: 4.1 G/DL (ref 3.5–5.2)
ALP SERPL-CCNC: 60 U/L (ref 55–135)
ALT SERPL W/O P-5'-P-CCNC: 22 U/L (ref 10–44)
ANION GAP SERPL CALC-SCNC: 12 MMOL/L (ref 8–16)
AST SERPL-CCNC: 21 U/L (ref 10–40)
BASOPHILS # BLD AUTO: 0.04 K/UL (ref 0–0.2)
BASOPHILS NFR BLD: 0.3 % (ref 0–1.9)
BILIRUB SERPL-MCNC: 0.4 MG/DL (ref 0.1–1)
BUN SERPL-MCNC: 12 MG/DL (ref 8–23)
CALCIUM SERPL-MCNC: 9.5 MG/DL (ref 8.7–10.5)
CHLORIDE SERPL-SCNC: 102 MMOL/L (ref 95–110)
CO2 SERPL-SCNC: 26 MMOL/L (ref 23–29)
CREAT SERPL-MCNC: 0.8 MG/DL (ref 0.5–1.4)
CTP QC/QA: YES
CTP QC/QA: YES
DIFFERENTIAL METHOD: ABNORMAL
EOSINOPHIL # BLD AUTO: 0.1 K/UL (ref 0–0.5)
EOSINOPHIL NFR BLD: 0.8 % (ref 0–8)
ERYTHROCYTE [DISTWIDTH] IN BLOOD BY AUTOMATED COUNT: 12.7 % (ref 11.5–14.5)
EST. GFR  (NO RACE VARIABLE): >60 ML/MIN/1.73 M^2
GLUCOSE SERPL-MCNC: 114 MG/DL (ref 70–110)
HCT VFR BLD AUTO: 39.7 % (ref 37–48.5)
HGB BLD-MCNC: 13.6 G/DL (ref 12–16)
IMM GRANULOCYTES # BLD AUTO: 0.06 K/UL (ref 0–0.04)
IMM GRANULOCYTES NFR BLD AUTO: 0.5 % (ref 0–0.5)
LIPASE SERPL-CCNC: 19 U/L (ref 4–60)
LYMPHOCYTES # BLD AUTO: 2 K/UL (ref 1–4.8)
LYMPHOCYTES NFR BLD: 17.5 % (ref 18–48)
MCH RBC QN AUTO: 29.8 PG (ref 27–31)
MCHC RBC AUTO-ENTMCNC: 34.3 G/DL (ref 32–36)
MCV RBC AUTO: 87 FL (ref 82–98)
MONOCYTES # BLD AUTO: 0.5 K/UL (ref 0.3–1)
MONOCYTES NFR BLD: 4.5 % (ref 4–15)
NEUTROPHILS # BLD AUTO: 8.9 K/UL (ref 1.8–7.7)
NEUTROPHILS NFR BLD: 76.4 % (ref 38–73)
NRBC BLD-RTO: 0 /100 WBC
PLATELET # BLD AUTO: 249 K/UL (ref 150–450)
PMV BLD AUTO: 11.2 FL (ref 9.2–12.9)
POC MOLECULAR INFLUENZA A AGN: NEGATIVE
POC MOLECULAR INFLUENZA B AGN: NEGATIVE
POTASSIUM SERPL-SCNC: 2.8 MMOL/L (ref 3.5–5.1)
PROT SERPL-MCNC: 7.5 G/DL (ref 6–8.4)
RBC # BLD AUTO: 4.57 M/UL (ref 4–5.4)
SARS-COV-2 RDRP RESP QL NAA+PROBE: NEGATIVE
SODIUM SERPL-SCNC: 140 MMOL/L (ref 136–145)
WBC # BLD AUTO: 11.69 K/UL (ref 3.9–12.7)

## 2023-06-19 PROCEDURE — 25000003 PHARM REV CODE 250: Mod: HCNC,ER | Performed by: EMERGENCY MEDICINE

## 2023-06-19 PROCEDURE — 87502 INFLUENZA DNA AMP PROBE: CPT | Mod: HCNC,ER

## 2023-06-19 PROCEDURE — 93010 EKG 12-LEAD: ICD-10-PCS | Mod: HCNC,,, | Performed by: INTERNAL MEDICINE

## 2023-06-19 PROCEDURE — 93010 ELECTROCARDIOGRAM REPORT: CPT | Mod: HCNC,,, | Performed by: INTERNAL MEDICINE

## 2023-06-19 PROCEDURE — 63600175 PHARM REV CODE 636 W HCPCS: Mod: HCNC,ER | Performed by: EMERGENCY MEDICINE

## 2023-06-19 PROCEDURE — 96375 TX/PRO/DX INJ NEW DRUG ADDON: CPT | Mod: HCNC,ER

## 2023-06-19 PROCEDURE — 87635 SARS-COV-2 COVID-19 AMP PRB: CPT | Mod: HCNC,ER | Performed by: EMERGENCY MEDICINE

## 2023-06-19 PROCEDURE — 83690 ASSAY OF LIPASE: CPT | Mod: HCNC,ER | Performed by: EMERGENCY MEDICINE

## 2023-06-19 PROCEDURE — 96365 THER/PROPH/DIAG IV INF INIT: CPT | Mod: HCNC,ER

## 2023-06-19 PROCEDURE — 85025 COMPLETE CBC W/AUTO DIFF WBC: CPT | Mod: HCNC,ER | Performed by: EMERGENCY MEDICINE

## 2023-06-19 PROCEDURE — 93005 ELECTROCARDIOGRAM TRACING: CPT | Mod: HCNC,ER

## 2023-06-19 PROCEDURE — 80053 COMPREHEN METABOLIC PANEL: CPT | Mod: HCNC,ER | Performed by: EMERGENCY MEDICINE

## 2023-06-19 PROCEDURE — 99284 EMERGENCY DEPT VISIT MOD MDM: CPT | Mod: 25,HCNC,ER

## 2023-06-19 RX ORDER — SEMAGLUTIDE 0.68 MG/ML
0.5 INJECTION, SOLUTION SUBCUTANEOUS
COMMUNITY
Start: 2023-05-15 | End: 2023-06-19 | Stop reason: SDUPTHER

## 2023-06-19 RX ORDER — ONDANSETRON 2 MG/ML
4 INJECTION INTRAMUSCULAR; INTRAVENOUS
Status: COMPLETED | OUTPATIENT
Start: 2023-06-19 | End: 2023-06-19

## 2023-06-19 RX ORDER — SEMAGLUTIDE 0.68 MG/ML
0.5 INJECTION, SOLUTION SUBCUTANEOUS
Qty: 9 ML | Refills: 0 | Status: SHIPPED | OUTPATIENT
Start: 2023-06-19 | End: 2023-07-10

## 2023-06-19 RX ORDER — POTASSIUM CHLORIDE 7.45 MG/ML
10 INJECTION INTRAVENOUS
Status: COMPLETED | OUTPATIENT
Start: 2023-06-19 | End: 2023-06-19

## 2023-06-19 RX ADMIN — ONDANSETRON 4 MG: 2 INJECTION INTRAMUSCULAR; INTRAVENOUS at 03:06

## 2023-06-19 RX ADMIN — POTASSIUM CHLORIDE 10 MEQ: 7.46 INJECTION, SOLUTION INTRAVENOUS at 04:06

## 2023-06-19 RX ADMIN — POTASSIUM BICARBONATE 40 MEQ: 391 TABLET, EFFERVESCENT ORAL at 04:06

## 2023-06-19 NOTE — ED PROVIDER NOTES
Encounter Date: 6/19/2023       History     Chief Complaint   Patient presents with    Fatigue     Fatigue, nausea, weakness, onset 1315, hx of hypokalemia      The history is provided by the patient.   Fatigue  This is a recurrent problem. The current episode started 1 to 2 hours ago. The problem occurs constantly. The problem has not changed since onset.Pertinent negatives include no chest pain, no abdominal pain, no headaches and no shortness of breath. Nothing aggravates the symptoms. Nothing relieves the symptoms.   Review of patient's allergies indicates:  No Known Allergies  Past Medical History:   Diagnosis Date    Hypertension      Past Surgical History:   Procedure Laterality Date    EYE SURGERY       Family History   Problem Relation Age of Onset    Breast cancer Maternal Grandmother      Social History     Tobacco Use    Smoking status: Never    Smokeless tobacco: Never   Substance Use Topics    Alcohol use: Yes     Comment: seldom    Drug use: Never     Review of Systems   Constitutional:  Positive for fatigue. Negative for fever.   HENT:  Negative for sore throat.    Respiratory:  Negative for shortness of breath.    Cardiovascular:  Negative for chest pain.   Gastrointestinal:  Negative for abdominal pain and nausea.   Genitourinary:  Negative for dysuria.   Musculoskeletal:  Negative for back pain.   Skin:  Negative for rash.   Neurological:  Negative for weakness and headaches.   Hematological:  Does not bruise/bleed easily.     Physical Exam     Initial Vitals [06/19/23 1502]   BP Pulse Resp Temp SpO2   131/64 80 16 97.9 °F (36.6 °C) 97 %      MAP       --         Physical Exam    Nursing note and vitals reviewed.  Constitutional: She appears well-developed and well-nourished. No distress.   HENT:   Head: Normocephalic and atraumatic.   Mouth/Throat: Oropharynx is clear and moist.   Eyes: Conjunctivae and EOM are normal. Pupils are equal, round, and reactive to light.   Neck: Neck supple.   Normal  range of motion.  Cardiovascular:  Normal rate, regular rhythm and normal heart sounds.     Exam reveals no gallop and no friction rub.       No murmur heard.  Pulmonary/Chest: Breath sounds normal. No respiratory distress. She has no wheezes. She has no rhonchi. She has no rales.   Abdominal: Abdomen is soft. Bowel sounds are normal. She exhibits no distension and no mass. There is no abdominal tenderness. There is no rebound and no guarding.   Musculoskeletal:         General: No tenderness or edema. Normal range of motion.      Cervical back: Normal range of motion and neck supple.     Neurological: She is alert and oriented to person, place, and time. She has normal strength.   Skin: Skin is warm and dry. No rash noted.   Psychiatric: She has a normal mood and affect. Thought content normal.       ED Course   Procedures  Labs Reviewed   CBC W/ AUTO DIFFERENTIAL - Abnormal; Notable for the following components:       Result Value    Gran # (ANC) 8.9 (*)     Immature Grans (Abs) 0.06 (*)     Gran % 76.4 (*)     Lymph % 17.5 (*)     All other components within normal limits   COMPREHENSIVE METABOLIC PANEL - Abnormal; Notable for the following components:    Potassium 2.8 (*)     Glucose 114 (*)     All other components within normal limits   LIPASE   URINALYSIS, REFLEX TO URINE CULTURE   SARS-COV-2 RDRP GENE   POCT INFLUENZA A/B MOLECULAR     EKG Readings: (Independently Interpreted)   Rhythm: Normal Sinus Rhythm. Heart Rate: 72. Ectopy: No Ectopy. Conduction: Normal. ST Segments: Normal ST Segments. T Waves: Normal. Clinical Impression: Normal Sinus Rhythm   ECG Results              EKG 12-lead (In process)  Result time 06/19/23 15:44:27      In process by Interface, Lab In [facility] (06/19/23 15:44:27)                   Narrative:    Test Reason : R53.83,    Vent. Rate : 072 BPM     Atrial Rate : 072 BPM     P-R Int : 146 ms          QRS Dur : 092 ms      QT Int : 434 ms       P-R-T Axes : 058 032 033 degrees      QTc Int : 475 ms    Sinus rhythm with frequent Premature ventricular complexes  Nonspecific ST abnormality  Abnormal ECG  When compared with ECG of 19-JAN-2023 09:46,  Nonspecific T wave abnormality no longer evident in Lateral leads    Referred By: PUSHPA   SELF           Confirmed By:                                   Imaging Results    None          Medications   potassium chloride 10 mEq in 100 mL IVPB (has no administration in time range)   potassium bicarbonate disintegrating tablet 40 mEq (has no administration in time range)   ondansetron injection 4 mg (4 mg Intravenous Given 6/19/23 1522)     Medical Decision Making:   Initial Assessment:   Generalized weakness and fatigue. S tates the last time she felt like this, her potassium was low  Differential Diagnosis:   Weakness, electrolyte abnormality  Clinical Tests:   Lab Tests: Ordered and Reviewed  The following lab test(s) were unremarkable: CBC, CMP and Urinalysis  Radiological Study: Ordered and Reviewed  ED Management:  Labs and imaging reviewed by me.  No acute findings except for a potassium of 2.8.  Considered admission, but patient is feeling better.  Will replacein the ED and send home with supplements..                          Clinical Impression:   Final diagnoses:  [E87.6] Hypokalemia (Primary)        ED Disposition Condition    Discharge Stable          ED Prescriptions       Medication Sig Dispense Start Date End Date Auth. Provider    potassium bicarbonate (K-LYTE) disintegrating tablet Take 1 tablet (20 mEq total) by mouth once daily. 30 tablet 6/19/2023 7/19/2023 Collin Mcerloy MD          Follow-up Information       Follow up With Specialties Details Why Contact Info    Gianfranco Amanda MD Internal Medicine, Family Medicine   [id number] [address] [address]  [phone]               Collin Mcelroy MD  06/19/23 2712

## 2023-06-29 ENCOUNTER — LAB VISIT (OUTPATIENT)
Dept: LAB | Facility: HOSPITAL | Age: 63
End: 2023-06-29
Attending: STUDENT IN AN ORGANIZED HEALTH CARE EDUCATION/TRAINING PROGRAM
Payer: MEDICARE

## 2023-06-29 ENCOUNTER — OFFICE VISIT (OUTPATIENT)
Dept: INTERNAL MEDICINE | Facility: CLINIC | Age: 63
End: 2023-06-29
Payer: MEDICARE

## 2023-06-29 VITALS
DIASTOLIC BLOOD PRESSURE: 70 MMHG | WEIGHT: 176.81 LBS | TEMPERATURE: 98 F | HEART RATE: 90 BPM | BODY MASS INDEX: 31.33 KG/M2 | HEIGHT: 63 IN | SYSTOLIC BLOOD PRESSURE: 118 MMHG | OXYGEN SATURATION: 95 %

## 2023-06-29 DIAGNOSIS — I10 PRIMARY HYPERTENSION: Primary | ICD-10-CM

## 2023-06-29 DIAGNOSIS — I10 PRIMARY HYPERTENSION: ICD-10-CM

## 2023-06-29 LAB
ALBUMIN SERPL BCP-MCNC: 3.9 G/DL (ref 3.5–5.2)
ALP SERPL-CCNC: 55 U/L (ref 55–135)
ALT SERPL W/O P-5'-P-CCNC: 18 U/L (ref 10–44)
ANION GAP SERPL CALC-SCNC: 12 MMOL/L (ref 8–16)
AST SERPL-CCNC: 19 U/L (ref 10–40)
BILIRUB SERPL-MCNC: 0.3 MG/DL (ref 0.1–1)
BUN SERPL-MCNC: 16 MG/DL (ref 8–23)
CALCIUM SERPL-MCNC: 9.6 MG/DL (ref 8.7–10.5)
CHLORIDE SERPL-SCNC: 103 MMOL/L (ref 95–110)
CO2 SERPL-SCNC: 28 MMOL/L (ref 23–29)
CREAT SERPL-MCNC: 1.4 MG/DL (ref 0.5–1.4)
EST. GFR  (NO RACE VARIABLE): 41.8 ML/MIN/1.73 M^2
GLUCOSE SERPL-MCNC: 91 MG/DL (ref 70–110)
POTASSIUM SERPL-SCNC: 3.6 MMOL/L (ref 3.5–5.1)
PROT SERPL-MCNC: 7.4 G/DL (ref 6–8.4)
SODIUM SERPL-SCNC: 143 MMOL/L (ref 136–145)

## 2023-06-29 PROCEDURE — 3008F PR BODY MASS INDEX (BMI) DOCUMENTED: ICD-10-PCS | Mod: HCNC,CPTII,S$GLB, | Performed by: STUDENT IN AN ORGANIZED HEALTH CARE EDUCATION/TRAINING PROGRAM

## 2023-06-29 PROCEDURE — 1159F PR MEDICATION LIST DOCUMENTED IN MEDICAL RECORD: ICD-10-PCS | Mod: HCNC,CPTII,S$GLB, | Performed by: STUDENT IN AN ORGANIZED HEALTH CARE EDUCATION/TRAINING PROGRAM

## 2023-06-29 PROCEDURE — 3074F PR MOST RECENT SYSTOLIC BLOOD PRESSURE < 130 MM HG: ICD-10-PCS | Mod: HCNC,CPTII,S$GLB, | Performed by: STUDENT IN AN ORGANIZED HEALTH CARE EDUCATION/TRAINING PROGRAM

## 2023-06-29 PROCEDURE — 3044F PR MOST RECENT HEMOGLOBIN A1C LEVEL <7.0%: ICD-10-PCS | Mod: HCNC,CPTII,S$GLB, | Performed by: STUDENT IN AN ORGANIZED HEALTH CARE EDUCATION/TRAINING PROGRAM

## 2023-06-29 PROCEDURE — 3008F BODY MASS INDEX DOCD: CPT | Mod: HCNC,CPTII,S$GLB, | Performed by: STUDENT IN AN ORGANIZED HEALTH CARE EDUCATION/TRAINING PROGRAM

## 2023-06-29 PROCEDURE — 1159F MED LIST DOCD IN RCRD: CPT | Mod: HCNC,CPTII,S$GLB, | Performed by: STUDENT IN AN ORGANIZED HEALTH CARE EDUCATION/TRAINING PROGRAM

## 2023-06-29 PROCEDURE — 80053 COMPREHEN METABOLIC PANEL: CPT | Mod: HCNC,PO | Performed by: STUDENT IN AN ORGANIZED HEALTH CARE EDUCATION/TRAINING PROGRAM

## 2023-06-29 PROCEDURE — 3078F DIAST BP <80 MM HG: CPT | Mod: HCNC,CPTII,S$GLB, | Performed by: STUDENT IN AN ORGANIZED HEALTH CARE EDUCATION/TRAINING PROGRAM

## 2023-06-29 PROCEDURE — 36415 COLL VENOUS BLD VENIPUNCTURE: CPT | Mod: HCNC,PO | Performed by: STUDENT IN AN ORGANIZED HEALTH CARE EDUCATION/TRAINING PROGRAM

## 2023-06-29 PROCEDURE — 3044F HG A1C LEVEL LT 7.0%: CPT | Mod: HCNC,CPTII,S$GLB, | Performed by: STUDENT IN AN ORGANIZED HEALTH CARE EDUCATION/TRAINING PROGRAM

## 2023-06-29 PROCEDURE — 99214 PR OFFICE/OUTPT VISIT, EST, LEVL IV, 30-39 MIN: ICD-10-PCS | Mod: HCNC,S$GLB,, | Performed by: STUDENT IN AN ORGANIZED HEALTH CARE EDUCATION/TRAINING PROGRAM

## 2023-06-29 PROCEDURE — 3078F PR MOST RECENT DIASTOLIC BLOOD PRESSURE < 80 MM HG: ICD-10-PCS | Mod: HCNC,CPTII,S$GLB, | Performed by: STUDENT IN AN ORGANIZED HEALTH CARE EDUCATION/TRAINING PROGRAM

## 2023-06-29 PROCEDURE — 3288F FALL RISK ASSESSMENT DOCD: CPT | Mod: HCNC,CPTII,S$GLB, | Performed by: STUDENT IN AN ORGANIZED HEALTH CARE EDUCATION/TRAINING PROGRAM

## 2023-06-29 PROCEDURE — 99999 PR PBB SHADOW E&M-EST. PATIENT-LVL III: CPT | Mod: PBBFAC,HCNC,, | Performed by: STUDENT IN AN ORGANIZED HEALTH CARE EDUCATION/TRAINING PROGRAM

## 2023-06-29 PROCEDURE — 99214 OFFICE O/P EST MOD 30 MIN: CPT | Mod: HCNC,S$GLB,, | Performed by: STUDENT IN AN ORGANIZED HEALTH CARE EDUCATION/TRAINING PROGRAM

## 2023-06-29 PROCEDURE — 3074F SYST BP LT 130 MM HG: CPT | Mod: HCNC,CPTII,S$GLB, | Performed by: STUDENT IN AN ORGANIZED HEALTH CARE EDUCATION/TRAINING PROGRAM

## 2023-06-29 PROCEDURE — 1101F PT FALLS ASSESS-DOCD LE1/YR: CPT | Mod: HCNC,CPTII,S$GLB, | Performed by: STUDENT IN AN ORGANIZED HEALTH CARE EDUCATION/TRAINING PROGRAM

## 2023-06-29 PROCEDURE — 99999 PR PBB SHADOW E&M-EST. PATIENT-LVL III: ICD-10-PCS | Mod: PBBFAC,HCNC,, | Performed by: STUDENT IN AN ORGANIZED HEALTH CARE EDUCATION/TRAINING PROGRAM

## 2023-06-29 PROCEDURE — 1101F PR PT FALLS ASSESS DOC 0-1 FALLS W/OUT INJ PAST YR: ICD-10-PCS | Mod: HCNC,CPTII,S$GLB, | Performed by: STUDENT IN AN ORGANIZED HEALTH CARE EDUCATION/TRAINING PROGRAM

## 2023-06-29 PROCEDURE — 3288F PR FALLS RISK ASSESSMENT DOCUMENTED: ICD-10-PCS | Mod: HCNC,CPTII,S$GLB, | Performed by: STUDENT IN AN ORGANIZED HEALTH CARE EDUCATION/TRAINING PROGRAM

## 2023-06-29 NOTE — PROGRESS NOTES
No chief complaint on file.    HPI: Julio Browne is a 63 y.o. female  with Pmhx listed below who presents to clinic for Routine Follow up . She reports to be doing well. Denies having any shortness of breath, chest pain, abdominal pain, palpitation, leg swelling, syncopal episode, nausea, vomiting, fever and other complains at present. Medication list has been reviewed and updated along with her. She reports to be compliant with her medication and has no issues taking it. She was seen in ER on 06/19/2023 for fatigue where routine lab work was done and was found to have hypokalemia with K:2.7 . She was started on potassium bicarbonate. Today she denied having any symptoms. No other concerns and complains today.       Problem List:  Patient Active Problem List   Diagnosis    Hypertension       ROS: Negative except as noted above.       Current Meds:  Current Outpatient Medications   Medication Sig Dispense Refill    amLODIPine (NORVASC) 5 MG tablet amlodipine Take (oral) 20201021 tablet No frequency recorded oral No set duration recorded No set duration amount recorded active 5 mg      calcium carbonate-vit D3-min 600 mg calcium- 400 unit Tab Take 1 tablet by mouth 2 (two) times a day. 180 tablet 0    OZEMPIC 0.25 mg or 0.5 mg (2 mg/3 mL) pen injector Inject 0.5 mg into the skin every 7 days. 9 mL 0    potassium bicarbonate (K-LYTE) disintegrating tablet Take 1 tablet (20 mEq total) by mouth once daily. 30 tablet 0     No current facility-administered medications for this visit.      PE:  BP: 118/70  Pulse: 90     Temp: 97.9 °F (36.6 °C)  Weight: 80.2 kg (176 lb 12.9 oz) Body mass index is 31.32 kg/m².    Wt Readings from Last 5 Encounters:   06/29/23 80.2 kg (176 lb 12.9 oz)   06/19/23 79 kg (174 lb 2.6 oz)   04/10/23 83.2 kg (183 lb 6.8 oz)   04/06/23 83.2 kg (183 lb 6.8 oz)   01/19/23 78.8 kg (173 lb 13.3 oz)     General appearance: alert and cooperative, not in acute distress  Head: normocephalic, without  obvious abnormality, atraumatic  Eyes: conjunctivae/corneas clear. PERRL, EOM's intact.  Ears: clear tympanic membranes   Neck: no adenopathy, supple, symmetrical, trachea midline and thyroid not enlarged, symmetric, no tenderness/mass/nodules, no JVD  Throat: lips, mucosa, and tongue normal; teeth and gums normal; no thrush  Chest: no reproducible chest pain   Heart: regular rate and rhythm, S1, S2 normal, no murmur, click, rub or gallop  Lungs: unlabored respiration, bilateral equal air entry, normal vesicular breath sound heard, no wheezing, rhonchi   Abdomen: soft, non-tender, non-distended; bowel sounds +; no masses,  no organomegaly, no ascites   Extremities: normal, atraumatic, no cyanosis or edema noted B/L upper and lower extremities.  Skin: skin color, texture, turgor normal. No rashes or lesions noted.  Neurologic: grossly intact      Lab:  Lab Results   Component Value Date    WBC 11.69 06/19/2023    HGB 13.6 06/19/2023    HCT 39.7 06/19/2023    MCV 87 06/19/2023     06/19/2023     06/19/2023    K 2.8 (L) 06/19/2023     06/19/2023    CO2 26 06/19/2023    BUN 12 06/19/2023     (H) 06/19/2023    CALCIUM 9.5 06/19/2023    MG 1.7 01/19/2023    AST 21 06/19/2023    ALT 22 06/19/2023    CHOL 198 04/10/2023    HDL 71 04/10/2023    LDLCALC 115.8 04/10/2023    TRIG 56 04/10/2023       Impression:    ICD-10-CM ICD-9-CM    1. Primary hypertension  I10 401.9 COMPREHENSIVE METABOLIC PANEL      1. Primary hypertension  - Low salt diet.  Enouraged to increase in physical activity at least 30 minutes of brisk walking/day , 5 days a week  Advised weight loss    Advised for DASH diet - The Dietary Approaches to Stop Hypertension (DASH) is high in vegetables, fruits, low-fat dairy products, whole grains, poultry, fish, and nuts and low in sweets, sugar-sweetened beverages, and red meats   Stop smoking.  Limit caffeine intake  Limit alcohol intake <3/day for men and <2/day for women.  Advised to be  compliant with medication.  Please monitor your blood pressure regularly at home   Return precaution provided  Normotensive  Incidence of hypokalemia twice in large 6 months  On hctz therapy and norvasc  I will stop hctz and continue norvasc  Ask her to keep bp log  Rtc in 2 weeks with bp log.    - COMPREHENSIVE METABOLIC PANEL; Future      Future Appointments   Date Time Provider Department Center   6/29/2023  4:40 PM [facility] [place]   11/8/2023 10:15 AM [facility] [place]       I spent a total of 25 minutes on the day of the visit.This includes face to face time and non-face to face time preparing to see the patient (eg, review of tests), obtaining and/or reviewing separately obtained history, documenting clinical information in the electronic or other health record, independently interpreting results and communicating results to the patient/family/caregiver, or care coordinator.      Gianfranco Amanda MD

## 2023-07-10 RX ORDER — SEMAGLUTIDE 0.68 MG/ML
0.5 INJECTION, SOLUTION SUBCUTANEOUS
Qty: 9 ML | Refills: 0 | Status: SHIPPED | OUTPATIENT
Start: 2023-07-10 | End: 2023-07-13

## 2023-07-13 ENCOUNTER — OFFICE VISIT (OUTPATIENT)
Dept: INTERNAL MEDICINE | Facility: CLINIC | Age: 63
End: 2023-07-13
Payer: MEDICARE

## 2023-07-13 VITALS
WEIGHT: 178.13 LBS | BODY MASS INDEX: 31.56 KG/M2 | SYSTOLIC BLOOD PRESSURE: 118 MMHG | DIASTOLIC BLOOD PRESSURE: 70 MMHG | HEART RATE: 85 BPM | TEMPERATURE: 97 F | HEIGHT: 63 IN | OXYGEN SATURATION: 96 %

## 2023-07-13 DIAGNOSIS — I10 PRIMARY HYPERTENSION: ICD-10-CM

## 2023-07-13 DIAGNOSIS — R73.9 HYPERGLYCEMIA: ICD-10-CM

## 2023-07-13 DIAGNOSIS — E66.09 CLASS 1 OBESITY DUE TO EXCESS CALORIES WITH BODY MASS INDEX (BMI) OF 32.0 TO 32.9 IN ADULT, UNSPECIFIED WHETHER SERIOUS COMORBIDITY PRESENT: Primary | ICD-10-CM

## 2023-07-13 PROCEDURE — 1159F MED LIST DOCD IN RCRD: CPT | Mod: HCNC,CPTII,S$GLB, | Performed by: STUDENT IN AN ORGANIZED HEALTH CARE EDUCATION/TRAINING PROGRAM

## 2023-07-13 PROCEDURE — 3074F PR MOST RECENT SYSTOLIC BLOOD PRESSURE < 130 MM HG: ICD-10-PCS | Mod: HCNC,CPTII,S$GLB, | Performed by: STUDENT IN AN ORGANIZED HEALTH CARE EDUCATION/TRAINING PROGRAM

## 2023-07-13 PROCEDURE — 3288F FALL RISK ASSESSMENT DOCD: CPT | Mod: HCNC,CPTII,S$GLB, | Performed by: STUDENT IN AN ORGANIZED HEALTH CARE EDUCATION/TRAINING PROGRAM

## 2023-07-13 PROCEDURE — 3078F PR MOST RECENT DIASTOLIC BLOOD PRESSURE < 80 MM HG: ICD-10-PCS | Mod: HCNC,CPTII,S$GLB, | Performed by: STUDENT IN AN ORGANIZED HEALTH CARE EDUCATION/TRAINING PROGRAM

## 2023-07-13 PROCEDURE — 1159F PR MEDICATION LIST DOCUMENTED IN MEDICAL RECORD: ICD-10-PCS | Mod: HCNC,CPTII,S$GLB, | Performed by: STUDENT IN AN ORGANIZED HEALTH CARE EDUCATION/TRAINING PROGRAM

## 2023-07-13 PROCEDURE — 99213 OFFICE O/P EST LOW 20 MIN: CPT | Mod: HCNC,S$GLB,, | Performed by: STUDENT IN AN ORGANIZED HEALTH CARE EDUCATION/TRAINING PROGRAM

## 2023-07-13 PROCEDURE — 3288F PR FALLS RISK ASSESSMENT DOCUMENTED: ICD-10-PCS | Mod: HCNC,CPTII,S$GLB, | Performed by: STUDENT IN AN ORGANIZED HEALTH CARE EDUCATION/TRAINING PROGRAM

## 2023-07-13 PROCEDURE — 3008F BODY MASS INDEX DOCD: CPT | Mod: HCNC,CPTII,S$GLB, | Performed by: STUDENT IN AN ORGANIZED HEALTH CARE EDUCATION/TRAINING PROGRAM

## 2023-07-13 PROCEDURE — 99213 PR OFFICE/OUTPT VISIT, EST, LEVL III, 20-29 MIN: ICD-10-PCS | Mod: HCNC,S$GLB,, | Performed by: STUDENT IN AN ORGANIZED HEALTH CARE EDUCATION/TRAINING PROGRAM

## 2023-07-13 PROCEDURE — 3044F HG A1C LEVEL LT 7.0%: CPT | Mod: HCNC,CPTII,S$GLB, | Performed by: STUDENT IN AN ORGANIZED HEALTH CARE EDUCATION/TRAINING PROGRAM

## 2023-07-13 PROCEDURE — 3074F SYST BP LT 130 MM HG: CPT | Mod: HCNC,CPTII,S$GLB, | Performed by: STUDENT IN AN ORGANIZED HEALTH CARE EDUCATION/TRAINING PROGRAM

## 2023-07-13 PROCEDURE — 3008F PR BODY MASS INDEX (BMI) DOCUMENTED: ICD-10-PCS | Mod: HCNC,CPTII,S$GLB, | Performed by: STUDENT IN AN ORGANIZED HEALTH CARE EDUCATION/TRAINING PROGRAM

## 2023-07-13 PROCEDURE — 3078F DIAST BP <80 MM HG: CPT | Mod: HCNC,CPTII,S$GLB, | Performed by: STUDENT IN AN ORGANIZED HEALTH CARE EDUCATION/TRAINING PROGRAM

## 2023-07-13 PROCEDURE — 1101F PR PT FALLS ASSESS DOC 0-1 FALLS W/OUT INJ PAST YR: ICD-10-PCS | Mod: HCNC,CPTII,S$GLB, | Performed by: STUDENT IN AN ORGANIZED HEALTH CARE EDUCATION/TRAINING PROGRAM

## 2023-07-13 PROCEDURE — 99999 PR PBB SHADOW E&M-EST. PATIENT-LVL III: CPT | Mod: PBBFAC,HCNC,, | Performed by: STUDENT IN AN ORGANIZED HEALTH CARE EDUCATION/TRAINING PROGRAM

## 2023-07-13 PROCEDURE — 99999 PR PBB SHADOW E&M-EST. PATIENT-LVL III: ICD-10-PCS | Mod: PBBFAC,HCNC,, | Performed by: STUDENT IN AN ORGANIZED HEALTH CARE EDUCATION/TRAINING PROGRAM

## 2023-07-13 PROCEDURE — 1101F PT FALLS ASSESS-DOCD LE1/YR: CPT | Mod: HCNC,CPTII,S$GLB, | Performed by: STUDENT IN AN ORGANIZED HEALTH CARE EDUCATION/TRAINING PROGRAM

## 2023-07-13 PROCEDURE — 3044F PR MOST RECENT HEMOGLOBIN A1C LEVEL <7.0%: ICD-10-PCS | Mod: HCNC,CPTII,S$GLB, | Performed by: STUDENT IN AN ORGANIZED HEALTH CARE EDUCATION/TRAINING PROGRAM

## 2023-07-13 RX ORDER — SEMAGLUTIDE 0.68 MG/ML
0.5 INJECTION, SOLUTION SUBCUTANEOUS
Qty: 9 ML | Refills: 0 | Status: SHIPPED | OUTPATIENT
Start: 2023-07-13 | End: 2023-10-11

## 2023-07-13 NOTE — PROGRESS NOTES
Chief Complaint   Patient presents with    Lab results     HPI: Julio Browne is a 63 y.o. female  with Pmhx listed below who presents to clinic for evaluation of blood pressure. I saw her on 6/29/2023 during which his blood pressure was found to be elevated. She was asked to keep bp log and was told to bring it back in 2 weeks visit. Review of the Bp log reveals that his blood pressure is well controlled. She denies having chest pain, headache, dizziness, palpitation, syncopal episode, blurry vision, leg swelling and other complains at present. No other issues and concerns today. She reports that she is not taking amlodipine, despite which her blood pressure is running good.     Problem List:  Patient Active Problem List   Diagnosis    Hypertension     ROS: Negative except as noted above.     Current Meds:  Current Outpatient Medications   Medication Sig Dispense Refill    amLODIPine (NORVASC) 5 MG tablet amlodipine Take (oral) 20201021 tablet No frequency recorded oral No set duration recorded No set duration amount recorded active 5 mg      calcium carbonate-vit D3-min 600 mg calcium- 400 unit Tab Take 1 tablet by mouth 2 (two) times a day. 180 tablet 0    potassium bicarbonate (K-LYTE) disintegrating tablet Take 1 tablet (20 mEq total) by mouth once daily. 30 tablet 0     No current facility-administered medications for this visit.      PE:  BP: 118/70  Pulse: 85     Temp: 97.3 °F (36.3 °C)  Weight: 80.8 kg (178 lb 2.1 oz) Body mass index is 31.55 kg/m².    Wt Readings from Last 5 Encounters:   07/13/23 80.8 kg (178 lb 2.1 oz)   06/29/23 80.2 kg (176 lb 12.9 oz)   06/19/23 79 kg (174 lb 2.6 oz)   04/10/23 83.2 kg (183 lb 6.8 oz)   04/06/23 83.2 kg (183 lb 6.8 oz)     General appearance: alert and cooperative, not in acute distress  Head: normocephalic, without obvious abnormality, atraumatic  Eyes: conjunctivae/corneas clear. PERRL, EOM's intact.  Ears: clear tympanic membranes   Neck: no adenopathy, supple,  symmetrical, trachea midline and thyroid not enlarged, symmetric, no tenderness/mass/nodules, no JVD  Throat: lips, mucosa, and tongue normal; teeth and gums normal; no thrush  Chest: no reproducible chest pain   Heart: regular rate and rhythm, S1, S2 normal, no murmur, click, rub or gallop  Lungs: unlabored respiration, bilateral equal air entry, normal vesicular breath sound heard, no wheezing, rhonchi   Abdomen: soft, non-tender, non-distended; bowel sounds +; no masses,  no organomegaly, no ascites   Extremities: normal, atraumatic, no cyanosis or edema noted B/L upper and lower extremities.  Skin: skin color, texture, turgor normal. No rashes or lesions noted.  Neurologic: grossly intact        Lab:  Lab Results   Component Value Date    WBC 11.69 06/19/2023    HGB 13.6 06/19/2023    HCT 39.7 06/19/2023    MCV 87 06/19/2023     06/19/2023     06/29/2023    K 3.6 06/29/2023     06/29/2023    CO2 28 06/29/2023    BUN 16 06/29/2023    GLU 91 06/29/2023    CALCIUM 9.6 06/29/2023    MG 1.7 01/19/2023    AST 19 06/29/2023    ALT 18 06/29/2023    CHOL 198 04/10/2023    HDL 71 04/10/2023    LDLCALC 115.8 04/10/2023    TRIG 56 04/10/2023       Impression:    ICD-10-CM ICD-9-CM    1. Primary hypertension  I10 401.9 Hypertension Digital Medicine (HDMP) Enrollment Order        1. Primary hypertension  Low salt diet.  Enouraged to increase in physical activity at least 30 minutes of brisk walking/day , 5 days a week  Advised weight loss    Advised for DASH diet - The Dietary Approaches to Stop Hypertension (DASH) is high in vegetables, fruits, low-fat dairy products, whole grains, poultry, fish, and nuts and low in sweets, sugar-sweetened beverages, and red meats   Stop smoking.  Limit caffeine intake  Limit alcohol intake <3/day for men and <2/day for women.  Advised to be compliant with medication.  Please monitor your blood pressure regularly at home   Return precaution provided  On amlodipine, stopped  taking it, despite which her blood pressue was running good.  Ok to hold amlodipine  In the mean time, will enroll her to HTN program  - Hypertension Digital Medicine (HDMP) Enrollment Order    Future Appointments   Date Time Provider Department Center   11/8/2023 10:15 AM [facility] [place]   1/16/2024  5:20 PM [facility] spent a total of 22 minutes on the day of the visit.This includes face to face time and non-face to face time preparing to see the patient (eg, review of tests), obtaining and/or reviewing separately obtained history, documenting clinical information in the electronic or other health record, independently interpreting results and communicating results to the patient/family/caregiver, or care coordinator.     Gianfranco Amanda MD

## 2023-07-19 ENCOUNTER — PATIENT MESSAGE (OUTPATIENT)
Dept: ADMINISTRATIVE | Facility: OTHER | Age: 63
End: 2023-07-19
Payer: MEDICARE

## 2023-07-20 ENCOUNTER — PES CALL (OUTPATIENT)
Dept: ADMINISTRATIVE | Facility: CLINIC | Age: 63
End: 2023-07-20
Payer: MEDICARE

## 2023-07-23 ENCOUNTER — PATIENT MESSAGE (OUTPATIENT)
Dept: ADMINISTRATIVE | Facility: OTHER | Age: 63
End: 2023-07-23
Payer: MEDICARE

## 2023-07-28 ENCOUNTER — LAB VISIT (OUTPATIENT)
Dept: LAB | Facility: HOSPITAL | Age: 63
End: 2023-07-28
Attending: NURSE PRACTITIONER
Payer: MEDICARE

## 2023-07-28 ENCOUNTER — OFFICE VISIT (OUTPATIENT)
Dept: INTERNAL MEDICINE | Facility: CLINIC | Age: 63
End: 2023-07-28
Payer: MEDICARE

## 2023-07-28 VITALS
DIASTOLIC BLOOD PRESSURE: 60 MMHG | SYSTOLIC BLOOD PRESSURE: 128 MMHG | HEIGHT: 63 IN | OXYGEN SATURATION: 98 % | WEIGHT: 178.56 LBS | BODY MASS INDEX: 31.64 KG/M2 | HEART RATE: 79 BPM | TEMPERATURE: 98 F

## 2023-07-28 DIAGNOSIS — R53.83 FATIGUE, UNSPECIFIED TYPE: ICD-10-CM

## 2023-07-28 DIAGNOSIS — Z86.39 HISTORY OF HYPOKALEMIA: ICD-10-CM

## 2023-07-28 DIAGNOSIS — I10 PRIMARY HYPERTENSION: ICD-10-CM

## 2023-07-28 DIAGNOSIS — R53.83 FATIGUE, UNSPECIFIED TYPE: Primary | ICD-10-CM

## 2023-07-28 LAB
ALBUMIN SERPL BCP-MCNC: 4 G/DL (ref 3.5–5.2)
ALP SERPL-CCNC: 56 U/L (ref 55–135)
ALT SERPL W/O P-5'-P-CCNC: 20 U/L (ref 10–44)
ANION GAP SERPL CALC-SCNC: 9 MMOL/L (ref 8–16)
AST SERPL-CCNC: 18 U/L (ref 10–40)
BASOPHILS # BLD AUTO: 0.02 K/UL (ref 0–0.2)
BASOPHILS NFR BLD: 0.3 % (ref 0–1.9)
BILIRUB SERPL-MCNC: 0.4 MG/DL (ref 0.1–1)
BUN SERPL-MCNC: 9 MG/DL (ref 8–23)
CALCIUM SERPL-MCNC: 9.3 MG/DL (ref 8.7–10.5)
CHLORIDE SERPL-SCNC: 104 MMOL/L (ref 95–110)
CO2 SERPL-SCNC: 26 MMOL/L (ref 23–29)
CREAT SERPL-MCNC: 0.8 MG/DL (ref 0.5–1.4)
DIFFERENTIAL METHOD: NORMAL
EOSINOPHIL # BLD AUTO: 0.1 K/UL (ref 0–0.5)
EOSINOPHIL NFR BLD: 0.8 % (ref 0–8)
ERYTHROCYTE [DISTWIDTH] IN BLOOD BY AUTOMATED COUNT: 13.2 % (ref 11.5–14.5)
EST. GFR  (NO RACE VARIABLE): >60 ML/MIN/1.73 M^2
GLUCOSE SERPL-MCNC: 105 MG/DL (ref 70–110)
HCT VFR BLD AUTO: 39.6 % (ref 37–48.5)
HGB BLD-MCNC: 13.3 G/DL (ref 12–16)
IMM GRANULOCYTES # BLD AUTO: 0.02 K/UL (ref 0–0.04)
IMM GRANULOCYTES NFR BLD AUTO: 0.3 % (ref 0–0.5)
LYMPHOCYTES # BLD AUTO: 2.1 K/UL (ref 1–4.8)
LYMPHOCYTES NFR BLD: 28.6 % (ref 18–48)
MCH RBC QN AUTO: 30.2 PG (ref 27–31)
MCHC RBC AUTO-ENTMCNC: 33.6 G/DL (ref 32–36)
MCV RBC AUTO: 90 FL (ref 82–98)
MONOCYTES # BLD AUTO: 0.5 K/UL (ref 0.3–1)
MONOCYTES NFR BLD: 6.8 % (ref 4–15)
NEUTROPHILS # BLD AUTO: 4.7 K/UL (ref 1.8–7.7)
NEUTROPHILS NFR BLD: 63.2 % (ref 38–73)
NRBC BLD-RTO: 0 /100 WBC
PLATELET # BLD AUTO: 262 K/UL (ref 150–450)
PMV BLD AUTO: 10.7 FL (ref 9.2–12.9)
POTASSIUM SERPL-SCNC: 3.9 MMOL/L (ref 3.5–5.1)
PROT SERPL-MCNC: 7.2 G/DL (ref 6–8.4)
RBC # BLD AUTO: 4.4 M/UL (ref 4–5.4)
SODIUM SERPL-SCNC: 139 MMOL/L (ref 136–145)
WBC # BLD AUTO: 7.49 K/UL (ref 3.9–12.7)

## 2023-07-28 PROCEDURE — 3078F PR MOST RECENT DIASTOLIC BLOOD PRESSURE < 80 MM HG: ICD-10-PCS | Mod: HCNC,CPTII,S$GLB, | Performed by: NURSE PRACTITIONER

## 2023-07-28 PROCEDURE — 3074F SYST BP LT 130 MM HG: CPT | Mod: HCNC,CPTII,S$GLB, | Performed by: NURSE PRACTITIONER

## 2023-07-28 PROCEDURE — 3044F HG A1C LEVEL LT 7.0%: CPT | Mod: HCNC,CPTII,S$GLB, | Performed by: NURSE PRACTITIONER

## 2023-07-28 PROCEDURE — 85025 COMPLETE CBC W/AUTO DIFF WBC: CPT | Mod: HCNC,PO | Performed by: NURSE PRACTITIONER

## 2023-07-28 PROCEDURE — 99213 OFFICE O/P EST LOW 20 MIN: CPT | Mod: HCNC,S$GLB,, | Performed by: NURSE PRACTITIONER

## 2023-07-28 PROCEDURE — 1101F PR PT FALLS ASSESS DOC 0-1 FALLS W/OUT INJ PAST YR: ICD-10-PCS | Mod: HCNC,CPTII,S$GLB, | Performed by: NURSE PRACTITIONER

## 2023-07-28 PROCEDURE — 1159F MED LIST DOCD IN RCRD: CPT | Mod: HCNC,CPTII,S$GLB, | Performed by: NURSE PRACTITIONER

## 2023-07-28 PROCEDURE — 99999 PR PBB SHADOW E&M-EST. PATIENT-LVL III: CPT | Mod: PBBFAC,HCNC,, | Performed by: NURSE PRACTITIONER

## 2023-07-28 PROCEDURE — 80053 COMPREHEN METABOLIC PANEL: CPT | Mod: HCNC,PO | Performed by: NURSE PRACTITIONER

## 2023-07-28 PROCEDURE — 1101F PT FALLS ASSESS-DOCD LE1/YR: CPT | Mod: HCNC,CPTII,S$GLB, | Performed by: NURSE PRACTITIONER

## 2023-07-28 PROCEDURE — 1160F PR REVIEW ALL MEDS BY PRESCRIBER/CLIN PHARMACIST DOCUMENTED: ICD-10-PCS | Mod: HCNC,CPTII,S$GLB, | Performed by: NURSE PRACTITIONER

## 2023-07-28 PROCEDURE — 1160F RVW MEDS BY RX/DR IN RCRD: CPT | Mod: HCNC,CPTII,S$GLB, | Performed by: NURSE PRACTITIONER

## 2023-07-28 PROCEDURE — 3074F PR MOST RECENT SYSTOLIC BLOOD PRESSURE < 130 MM HG: ICD-10-PCS | Mod: HCNC,CPTII,S$GLB, | Performed by: NURSE PRACTITIONER

## 2023-07-28 PROCEDURE — 3008F BODY MASS INDEX DOCD: CPT | Mod: HCNC,CPTII,S$GLB, | Performed by: NURSE PRACTITIONER

## 2023-07-28 PROCEDURE — 3288F PR FALLS RISK ASSESSMENT DOCUMENTED: ICD-10-PCS | Mod: HCNC,CPTII,S$GLB, | Performed by: NURSE PRACTITIONER

## 2023-07-28 PROCEDURE — 99213 PR OFFICE/OUTPT VISIT, EST, LEVL III, 20-29 MIN: ICD-10-PCS | Mod: HCNC,S$GLB,, | Performed by: NURSE PRACTITIONER

## 2023-07-28 PROCEDURE — 3008F PR BODY MASS INDEX (BMI) DOCUMENTED: ICD-10-PCS | Mod: HCNC,CPTII,S$GLB, | Performed by: NURSE PRACTITIONER

## 2023-07-28 PROCEDURE — 36415 COLL VENOUS BLD VENIPUNCTURE: CPT | Mod: HCNC,PO | Performed by: NURSE PRACTITIONER

## 2023-07-28 PROCEDURE — 1159F PR MEDICATION LIST DOCUMENTED IN MEDICAL RECORD: ICD-10-PCS | Mod: HCNC,CPTII,S$GLB, | Performed by: NURSE PRACTITIONER

## 2023-07-28 PROCEDURE — 3078F DIAST BP <80 MM HG: CPT | Mod: HCNC,CPTII,S$GLB, | Performed by: NURSE PRACTITIONER

## 2023-07-28 PROCEDURE — 99999 PR PBB SHADOW E&M-EST. PATIENT-LVL III: ICD-10-PCS | Mod: PBBFAC,HCNC,, | Performed by: NURSE PRACTITIONER

## 2023-07-28 PROCEDURE — 3044F PR MOST RECENT HEMOGLOBIN A1C LEVEL <7.0%: ICD-10-PCS | Mod: HCNC,CPTII,S$GLB, | Performed by: NURSE PRACTITIONER

## 2023-07-28 PROCEDURE — 3288F FALL RISK ASSESSMENT DOCD: CPT | Mod: HCNC,CPTII,S$GLB, | Performed by: NURSE PRACTITIONER

## 2023-07-28 NOTE — PROGRESS NOTES
"Subjective:       Patient ID: Julio Browne is a 63 y.o. female.    Chief Complaint: low potassium    Mrs. Browne presents to clinic for possible hypokalemia.  History of hypokalemia.  Seen in ER in January 2023 and June 2023.  States symptoms are similar to prior episodes.  Positive for fatigue and generalized weakness.  She has been drinking liquid IV and increased oral intake of potassium rich foods without improvement.    Patient Active Problem List   Diagnosis    Hypertension       Family History   Problem Relation Age of Onset    Breast cancer Maternal Grandmother      Past Surgical History:   Procedure Laterality Date    EYE SURGERY           Current Outpatient Medications:     calcium carbonate-vit D3-min 600 mg calcium- 400 unit Tab, Take 1 tablet by mouth 2 (two) times a day., Disp: 180 tablet, Rfl: 0    OZEMPIC 0.25 mg or 0.5 mg (2 mg/3 mL) pen injector, Inject 0.5 mg into the skin every 7 days., Disp: 9 mL, Rfl: 0    Review of Systems   Constitutional:  Positive for fatigue. Negative for chills and fever.   Respiratory:  Negative for shortness of breath.    Cardiovascular:  Negative for chest pain.   Gastrointestinal:  Negative for abdominal pain, blood in stool, constipation, diarrhea, nausea and vomiting.   Genitourinary:  Negative for difficulty urinating.   Musculoskeletal:  Negative for myalgias.   Skin:  Negative for rash.   Neurological:  Positive for weakness. Negative for dizziness, syncope and light-headedness.     Objective:   /60 (BP Location: Left arm, Patient Position: Sitting, BP Method: Large (Manual))   Pulse 79   Temp 98.1 °F (36.7 °C)   Ht 5' 3" (1.6 m)   Wt 81 kg (178 lb 9.2 oz)   SpO2 98%   BMI 31.63 kg/m²      Physical Exam  Vitals reviewed.   Constitutional:       General: She is not in acute distress.     Appearance: Normal appearance. She is obese. She is not ill-appearing, toxic-appearing or diaphoretic.   HENT:      Head: Normocephalic.   Eyes:      " "Conjunctiva/sclera: Conjunctivae normal.   Cardiovascular:      Rate and Rhythm: Normal rate and regular rhythm.      Heart sounds: Normal heart sounds. No murmur heard.  Pulmonary:      Effort: Pulmonary effort is normal. No respiratory distress.      Breath sounds: Normal breath sounds. No wheezing, rhonchi or rales.   Skin:     General: Skin is warm and dry.      Findings: No erythema or rash.   Neurological:      Mental Status: She is alert and oriented to person, place, and time.      Coordination: Coordination normal.      Gait: Gait normal.   Psychiatric:         Mood and Affect: Mood normal.         Behavior: Behavior normal.       Assessment & Plan     1. Fatigue, unspecified type  Comments:  No concerning findings on exam. Labs today. Advised to stay well hydrated.  Orders:  -     COMPREHENSIVE METABOLIC PANEL; Future; Expected date: 07/28/2023  -     CBC W/ AUTO DIFFERENTIAL; Future; Expected date: 07/28/2023    2. History of hypokalemia  Comments:  Reviewed prior ER notes and recent labs. Repeat K level today. Continue oral intake of potassium rich foods.  Orders:  -     COMPREHENSIVE METABOLIC PANEL; Future; Expected date: 07/28/2023    3. Primary hypertension  Assessment & Plan:  BP well controlled without medication.      7/28/2023 12:28 PM Addendum: CBC and CMP normal. Discussed results with patient. She reports feeling better. RTC and ER precautions reviewed.     SCOT Kilpatrick      Portions of this note may have been created with voice recognition software. Occasional "wrong-word" or "sound-a-like" substitutions may have occurred due to the inherent limitations of voice recognition software. Please, read the note carefully and recognize, using context, where substitutions have occurred.     "

## 2023-08-03 ENCOUNTER — OFFICE VISIT (OUTPATIENT)
Dept: INTERNAL MEDICINE | Facility: CLINIC | Age: 63
End: 2023-08-03
Payer: MEDICARE

## 2023-08-03 VITALS
DIASTOLIC BLOOD PRESSURE: 78 MMHG | HEIGHT: 63 IN | BODY MASS INDEX: 31.56 KG/M2 | HEART RATE: 83 BPM | SYSTOLIC BLOOD PRESSURE: 130 MMHG | OXYGEN SATURATION: 98 % | TEMPERATURE: 98 F | WEIGHT: 178.13 LBS

## 2023-08-03 DIAGNOSIS — Z00.00 ENCOUNTER FOR PREVENTIVE HEALTH EXAMINATION: Primary | ICD-10-CM

## 2023-08-03 DIAGNOSIS — E66.09 CLASS 1 OBESITY DUE TO EXCESS CALORIES WITHOUT SERIOUS COMORBIDITY WITH BODY MASS INDEX (BMI) OF 31.0 TO 31.9 IN ADULT: ICD-10-CM

## 2023-08-03 DIAGNOSIS — R03.0 ELEVATED BLOOD PRESSURE READING WITHOUT DIAGNOSIS OF HYPERTENSION: ICD-10-CM

## 2023-08-03 PROBLEM — I10 HYPERTENSION: Status: RESOLVED | Noted: 2023-04-06 | Resolved: 2023-08-03

## 2023-08-03 PROBLEM — E66.811 CLASS 1 OBESITY DUE TO EXCESS CALORIES WITHOUT SERIOUS COMORBIDITY WITH BODY MASS INDEX (BMI) OF 31.0 TO 31.9 IN ADULT: Status: ACTIVE | Noted: 2023-08-03

## 2023-08-03 PROCEDURE — 99999 PR PBB SHADOW E&M-EST. PATIENT-LVL III: CPT | Mod: PBBFAC,HCNC,, | Performed by: NURSE PRACTITIONER

## 2023-08-03 PROCEDURE — 1160F RVW MEDS BY RX/DR IN RCRD: CPT | Mod: HCNC,CPTII,S$GLB, | Performed by: NURSE PRACTITIONER

## 2023-08-03 PROCEDURE — 3288F FALL RISK ASSESSMENT DOCD: CPT | Mod: HCNC,CPTII,S$GLB, | Performed by: NURSE PRACTITIONER

## 2023-08-03 PROCEDURE — 3075F PR MOST RECENT SYSTOLIC BLOOD PRESS GE 130-139MM HG: ICD-10-PCS | Mod: HCNC,CPTII,S$GLB, | Performed by: NURSE PRACTITIONER

## 2023-08-03 PROCEDURE — 3075F SYST BP GE 130 - 139MM HG: CPT | Mod: HCNC,CPTII,S$GLB, | Performed by: NURSE PRACTITIONER

## 2023-08-03 PROCEDURE — 3044F HG A1C LEVEL LT 7.0%: CPT | Mod: HCNC,CPTII,S$GLB, | Performed by: NURSE PRACTITIONER

## 2023-08-03 PROCEDURE — G0402 PR WELCOME MEDICARE PREVENTIVE VISIT NEW ENROLLEE: ICD-10-PCS | Mod: HCNC,S$GLB,, | Performed by: NURSE PRACTITIONER

## 2023-08-03 PROCEDURE — 1159F PR MEDICATION LIST DOCUMENTED IN MEDICAL RECORD: ICD-10-PCS | Mod: HCNC,CPTII,S$GLB, | Performed by: NURSE PRACTITIONER

## 2023-08-03 PROCEDURE — 3008F BODY MASS INDEX DOCD: CPT | Mod: HCNC,CPTII,S$GLB, | Performed by: NURSE PRACTITIONER

## 2023-08-03 PROCEDURE — G0402 INITIAL PREVENTIVE EXAM: HCPCS | Mod: HCNC,S$GLB,, | Performed by: NURSE PRACTITIONER

## 2023-08-03 PROCEDURE — 3078F PR MOST RECENT DIASTOLIC BLOOD PRESSURE < 80 MM HG: ICD-10-PCS | Mod: HCNC,CPTII,S$GLB, | Performed by: NURSE PRACTITIONER

## 2023-08-03 PROCEDURE — 3008F PR BODY MASS INDEX (BMI) DOCUMENTED: ICD-10-PCS | Mod: HCNC,CPTII,S$GLB, | Performed by: NURSE PRACTITIONER

## 2023-08-03 PROCEDURE — 1101F PT FALLS ASSESS-DOCD LE1/YR: CPT | Mod: HCNC,CPTII,S$GLB, | Performed by: NURSE PRACTITIONER

## 2023-08-03 PROCEDURE — 1160F PR REVIEW ALL MEDS BY PRESCRIBER/CLIN PHARMACIST DOCUMENTED: ICD-10-PCS | Mod: HCNC,CPTII,S$GLB, | Performed by: NURSE PRACTITIONER

## 2023-08-03 PROCEDURE — 3078F DIAST BP <80 MM HG: CPT | Mod: HCNC,CPTII,S$GLB, | Performed by: NURSE PRACTITIONER

## 2023-08-03 PROCEDURE — 3044F PR MOST RECENT HEMOGLOBIN A1C LEVEL <7.0%: ICD-10-PCS | Mod: HCNC,CPTII,S$GLB, | Performed by: NURSE PRACTITIONER

## 2023-08-03 PROCEDURE — 1101F PR PT FALLS ASSESS DOC 0-1 FALLS W/OUT INJ PAST YR: ICD-10-PCS | Mod: HCNC,CPTII,S$GLB, | Performed by: NURSE PRACTITIONER

## 2023-08-03 PROCEDURE — 99999 PR PBB SHADOW E&M-EST. PATIENT-LVL III: ICD-10-PCS | Mod: PBBFAC,HCNC,, | Performed by: NURSE PRACTITIONER

## 2023-08-03 PROCEDURE — 3288F PR FALLS RISK ASSESSMENT DOCUMENTED: ICD-10-PCS | Mod: HCNC,CPTII,S$GLB, | Performed by: NURSE PRACTITIONER

## 2023-08-03 PROCEDURE — 1159F MED LIST DOCD IN RCRD: CPT | Mod: HCNC,CPTII,S$GLB, | Performed by: NURSE PRACTITIONER

## 2023-08-03 NOTE — PATIENT INSTRUCTIONS
Counseling and Referral of Other Preventative  (Italic type indicates deductible and co-insurance are waived)    Patient Name: Julio Browne  Today's Date: 8/3/2023    Health Maintenance       Date Due Completion Date    TETANUS VACCINE Never done ---    Shingles Vaccine (1 of 2) Never done ---    COVID-19 Vaccine (4 - Moderna series) 02/16/2022 12/22/2021    Influenza Vaccine (1) 09/01/2023 ---    Mammogram 05/05/2024 5/5/2023    Hemoglobin A1c (Diabetic Prevention Screening) 04/10/2026 4/10/2023    DEXA Scan 04/13/2026 4/13/2023    Colorectal Cancer Screening 04/23/2026 4/23/2023    Lipid Panel 04/10/2028 4/10/2023        No orders of the defined types were placed in this encounter.    The following information is provided to all patients.  This information is to help you find resources for any of the problems found today that may be affecting your health:                Living healthy guide: www.[facility].[place].gov      Understanding Diabetes: www.diabetes.org      Eating healthy: www.cdc.gov/healthyweight      CDC home safety checklist: www.cdc.gov/steadi/patient.html      Agency on Aging: www.goea.[place].gov      Alcoholics anonymous (AA): www.aa.org      Physical Activity: www.lisa.nih.gov/pq0cwgt      Tobacco use: www.quitwithusla.org

## 2023-08-03 NOTE — PROGRESS NOTES
"Julio Browne presented for a  Medicare AWV and comprehensive Health Risk Assessment today. The following components were reviewed and updated:    Medical history  Family History  Social history  Allergies and Current Medications  Health Risk Assessment  Health Maintenance  Care Team         ** See Completed Assessments for Annual Wellness Visit within the encounter summary.**         The following assessments were completed:  Living Situation  CAGE  Depression Screening  Timed Get Up and Go  Whisper Test  Cognitive Function Screening  Nutrition Screening  ADL Screening  PAQ Screening          Vitals:    08/03/23 0952   BP: 130/78   BP Location: Left arm   Patient Position: Sitting   BP Method: Medium (Manual)   Pulse: 83   Temp: 98.1 °F (36.7 °C)   SpO2: 98%   Weight: 80.8 kg (178 lb 2.1 oz)   Height: 5' 3" (1.6 m)     Body mass index is 31.55 kg/m².  Physical Exam  Vitals reviewed.   Constitutional:       General: She is not in acute distress.     Appearance: Normal appearance. She is obese. She is not ill-appearing.   HENT:      Head: Normocephalic.   Eyes:      Conjunctiva/sclera: Conjunctivae normal.   Cardiovascular:      Rate and Rhythm: Normal rate and regular rhythm.      Heart sounds: Normal heart sounds. No murmur heard.  Pulmonary:      Effort: Pulmonary effort is normal. No respiratory distress.      Breath sounds: Normal breath sounds. No wheezing, rhonchi or rales.   Musculoskeletal:      Right lower leg: No edema.      Left lower leg: No edema.   Skin:     General: Skin is dry.      Findings: No erythema or rash.   Neurological:      Mental Status: She is alert and oriented to person, place, and time.      Coordination: Coordination normal.      Gait: Gait normal.   Psychiatric:         Behavior: Behavior normal.           Diagnoses and health risks identified today and associated recommendations/orders:    1. Encounter for preventive health examination  No concerning findings on exam or HRA " screenings. Discussed health maintenance including tetanus, shingles, and COVID vaccines which can be completed at pharmacy.     Routine labs and PCP appointment are up to date.    I offered to discuss advanced care planning, including how to pick a person who would make decisions for you if you were unable to make them for yourself, called a health care power of , and what kind of decisions you might make such as use of life sustaining treatments such as ventilators and tube feeding when faced with a life limiting illness recorded on a living will that they will need to know. (How you want to be cared for as you near the end of your natural life)     X Patient is interested in learning more about how to make advanced directives.  I provided them paperwork and offered to discuss this with them.    2. Elevated blood pressure reading without diagnosis of hypertension    Stable. In clinic and home BP readings in normal range. No clinical indication for medication at this time. Continue to monitor and notify clinic if BP readings consistently >140/90. Followed by PCP.    3. Class 1 obesity due to excess calories without serious comorbidity with body mass index (BMI) of 31.0 to 31.9 adult  Counseled on importance of diet and exercise. Encouraged patient to have an active lifestyle with regular exercise with healthy eating.     Provided Julio with a 5-10 year written screening schedule and personal prevention plan. Recommendations were developed using the USPSTF age appropriate recommendations. Education, counseling, and referrals were provided as needed. After Visit Summary printed and given to patient which includes a list of additional screenings\tests needed.    Follow up in about 1 year (around 8/3/2024) for AWV.    SCOT Nava

## 2023-08-21 ENCOUNTER — PATIENT MESSAGE (OUTPATIENT)
Dept: ADMINISTRATIVE | Facility: OTHER | Age: 63
End: 2023-08-21
Payer: MEDICARE

## 2023-09-08 ENCOUNTER — OFFICE VISIT (OUTPATIENT)
Dept: INTERNAL MEDICINE | Facility: CLINIC | Age: 63
End: 2023-09-08
Payer: MEDICARE

## 2023-09-08 ENCOUNTER — HOSPITAL ENCOUNTER (OUTPATIENT)
Dept: RADIOLOGY | Facility: HOSPITAL | Age: 63
Discharge: HOME | End: 2023-09-08
Attending: NURSE PRACTITIONER
Payer: MEDICARE

## 2023-09-08 VITALS
BODY MASS INDEX: 31.33 KG/M2 | HEART RATE: 99 BPM | RESPIRATION RATE: 18 BRPM | DIASTOLIC BLOOD PRESSURE: 68 MMHG | TEMPERATURE: 98 F | HEIGHT: 63 IN | SYSTOLIC BLOOD PRESSURE: 132 MMHG | OXYGEN SATURATION: 98 % | WEIGHT: 176.81 LBS

## 2023-09-08 DIAGNOSIS — M25.512 ACUTE PAIN OF LEFT SHOULDER: Primary | ICD-10-CM

## 2023-09-08 DIAGNOSIS — M25.512 ACUTE PAIN OF LEFT SHOULDER: ICD-10-CM

## 2023-09-08 PROCEDURE — 3075F PR MOST RECENT SYSTOLIC BLOOD PRESS GE 130-139MM HG: ICD-10-PCS | Mod: HCNC,CPTII,S$GLB, | Performed by: NURSE PRACTITIONER

## 2023-09-08 PROCEDURE — 1159F PR MEDICATION LIST DOCUMENTED IN MEDICAL RECORD: ICD-10-PCS | Mod: HCNC,CPTII,S$GLB, | Performed by: NURSE PRACTITIONER

## 2023-09-08 PROCEDURE — 73030 X-RAY EXAM OF SHOULDER: CPT | Mod: 26,HCNC,LT, | Performed by: RADIOLOGY

## 2023-09-08 PROCEDURE — 1160F RVW MEDS BY RX/DR IN RCRD: CPT | Mod: HCNC,CPTII,S$GLB, | Performed by: NURSE PRACTITIONER

## 2023-09-08 PROCEDURE — 3078F PR MOST RECENT DIASTOLIC BLOOD PRESSURE < 80 MM HG: ICD-10-PCS | Mod: HCNC,CPTII,S$GLB, | Performed by: NURSE PRACTITIONER

## 2023-09-08 PROCEDURE — 3288F FALL RISK ASSESSMENT DOCD: CPT | Mod: HCNC,CPTII,S$GLB, | Performed by: NURSE PRACTITIONER

## 2023-09-08 PROCEDURE — 73030 XR SHOULDER COMPLETE 2 OR MORE VIEWS LEFT: ICD-10-PCS | Mod: 26,HCNC,LT, | Performed by: RADIOLOGY

## 2023-09-08 PROCEDURE — 1101F PR PT FALLS ASSESS DOC 0-1 FALLS W/OUT INJ PAST YR: ICD-10-PCS | Mod: HCNC,CPTII,S$GLB, | Performed by: NURSE PRACTITIONER

## 2023-09-08 PROCEDURE — 99213 PR OFFICE/OUTPT VISIT, EST, LEVL III, 20-29 MIN: ICD-10-PCS | Mod: HCNC,25,S$GLB, | Performed by: NURSE PRACTITIONER

## 2023-09-08 PROCEDURE — 3078F DIAST BP <80 MM HG: CPT | Mod: HCNC,CPTII,S$GLB, | Performed by: NURSE PRACTITIONER

## 2023-09-08 PROCEDURE — 3075F SYST BP GE 130 - 139MM HG: CPT | Mod: HCNC,CPTII,S$GLB, | Performed by: NURSE PRACTITIONER

## 2023-09-08 PROCEDURE — 99213 OFFICE O/P EST LOW 20 MIN: CPT | Mod: HCNC,25,S$GLB, | Performed by: NURSE PRACTITIONER

## 2023-09-08 PROCEDURE — 3008F BODY MASS INDEX DOCD: CPT | Mod: HCNC,CPTII,S$GLB, | Performed by: NURSE PRACTITIONER

## 2023-09-08 PROCEDURE — 99999 PR PBB SHADOW E&M-EST. PATIENT-LVL IV: ICD-10-PCS | Mod: PBBFAC,HCNC,, | Performed by: NURSE PRACTITIONER

## 2023-09-08 PROCEDURE — 96372 PR INJECTION,THERAP/PROPH/DIAG2ST, IM OR SUBCUT: ICD-10-PCS | Mod: HCNC,S$GLB,, | Performed by: NURSE PRACTITIONER

## 2023-09-08 PROCEDURE — 96372 THER/PROPH/DIAG INJ SC/IM: CPT | Mod: HCNC,S$GLB,, | Performed by: NURSE PRACTITIONER

## 2023-09-08 PROCEDURE — 3044F PR MOST RECENT HEMOGLOBIN A1C LEVEL <7.0%: ICD-10-PCS | Mod: HCNC,CPTII,S$GLB, | Performed by: NURSE PRACTITIONER

## 2023-09-08 PROCEDURE — 99999 PR PBB SHADOW E&M-EST. PATIENT-LVL IV: CPT | Mod: PBBFAC,HCNC,, | Performed by: NURSE PRACTITIONER

## 2023-09-08 PROCEDURE — 3288F PR FALLS RISK ASSESSMENT DOCUMENTED: ICD-10-PCS | Mod: HCNC,CPTII,S$GLB, | Performed by: NURSE PRACTITIONER

## 2023-09-08 PROCEDURE — 3044F HG A1C LEVEL LT 7.0%: CPT | Mod: HCNC,CPTII,S$GLB, | Performed by: NURSE PRACTITIONER

## 2023-09-08 PROCEDURE — 3008F PR BODY MASS INDEX (BMI) DOCUMENTED: ICD-10-PCS | Mod: HCNC,CPTII,S$GLB, | Performed by: NURSE PRACTITIONER

## 2023-09-08 PROCEDURE — 1159F MED LIST DOCD IN RCRD: CPT | Mod: HCNC,CPTII,S$GLB, | Performed by: NURSE PRACTITIONER

## 2023-09-08 PROCEDURE — 73030 X-RAY EXAM OF SHOULDER: CPT | Mod: TC,HCNC,PO,LT

## 2023-09-08 PROCEDURE — 1160F PR REVIEW ALL MEDS BY PRESCRIBER/CLIN PHARMACIST DOCUMENTED: ICD-10-PCS | Mod: HCNC,CPTII,S$GLB, | Performed by: NURSE PRACTITIONER

## 2023-09-08 PROCEDURE — 1101F PT FALLS ASSESS-DOCD LE1/YR: CPT | Mod: HCNC,CPTII,S$GLB, | Performed by: NURSE PRACTITIONER

## 2023-09-08 RX ORDER — KETOROLAC TROMETHAMINE 30 MG/ML
30 INJECTION, SOLUTION INTRAMUSCULAR; INTRAVENOUS ONCE
Status: COMPLETED | OUTPATIENT
Start: 2023-09-08 | End: 2023-09-08

## 2023-09-08 RX ORDER — NABUMETONE 750 MG/1
750 TABLET, FILM COATED ORAL 2 TIMES DAILY
Qty: 20 TABLET | Refills: 0 | Status: SHIPPED | OUTPATIENT
Start: 2023-09-08 | End: 2023-09-18

## 2023-09-08 RX ADMIN — KETOROLAC TROMETHAMINE 30 MG: 30 INJECTION, SOLUTION INTRAMUSCULAR; INTRAVENOUS at 09:09

## 2023-09-08 NOTE — PROGRESS NOTES
Subjective:       Patient ID: Julio Browne is a 63 y.o. female.    Chief Complaint: Arm Pain    Mrs. Browne please to clinic for left shoulder/arm pain and decreased range of motion.  Pain started few days prior, but worsened yesterday.  Pain starts at left shoulder and radiates down left deltoid and into left arm.  Unable to perform active range of motion and limited passive range of motion.  Took Tylenol this morning which did provide some relief.  Reports having similar symptoms to right arm approximately 10 years ago which eventually self resolved.  Denies any injury or trauma.  Denies numbness/tingling.    Arm Pain   The incident occurred 3 to 5 days ago. There was no injury mechanism. The pain is present in the left shoulder and upper left arm. The quality of the pain is described as shooting. The pain does not radiate. The pain is at a severity of 7/10. The pain has been Constant since the incident. Pertinent negatives include no chest pain, muscle weakness, numbness or tingling. Nothing aggravates the symptoms. She has tried acetaminophen for the symptoms. The treatment provided moderate relief.       Patient Active Problem List   Diagnosis    Elevated blood pressure reading without diagnosis of hypertension    Class 1 obesity due to excess calories without serious comorbidity with body mass index (BMI) of 31.0 to 31.9 in adult       Family History   Problem Relation Age of Onset    Breast cancer Maternal Grandmother      Past Surgical History:   Procedure Laterality Date    BREAST BIOPSY      EYE SURGERY           Current Outpatient Medications:     OZEMPIC 0.25 mg or 0.5 mg (2 mg/3 mL) pen injector, Inject 0.5 mg into the skin every 7 days., Disp: 9 mL, Rfl: 0    calcium carbonate-vit D3-min 600 mg calcium- 400 unit Tab, Take 1 tablet by mouth 2 (two) times a day., Disp: 180 tablet, Rfl: 0    nabumetone (RELAFEN) 750 MG tablet, Take 1 tablet (750 mg total) by mouth 2 (two) times daily. for 10 days, Disp:  "20 tablet, Rfl: 0  No current facility-administered medications for this visit.    Review of Systems   Constitutional:  Negative for chills and fever.   Respiratory:  Negative for shortness of breath.    Cardiovascular:  Negative for chest pain.   Musculoskeletal:  Positive for arthralgias. Negative for joint swelling and neck pain.   Skin:  Negative for color change, rash and wound.   Neurological:  Negative for tingling, weakness and numbness.       Objective:   /68   Pulse 99   Temp 98.2 °F (36.8 °C) (Tympanic)   Resp 18   Ht 5' 3" (1.6 m)   Wt 80.2 kg (176 lb 12.9 oz)   SpO2 98%   BMI 31.32 kg/m²      Physical Exam  Constitutional:       General: She is not in acute distress.     Appearance: Normal appearance. She is not ill-appearing.   Cardiovascular:      Rate and Rhythm: Normal rate.   Pulmonary:      Effort: Pulmonary effort is normal. No respiratory distress.   Musculoskeletal:      Right shoulder: Normal.      Left shoulder: Tenderness present. No swelling, deformity, effusion or laceration. Decreased range of motion. Decreased strength. Normal pulse.      Left upper arm: No swelling, edema, deformity, lacerations, tenderness or bony tenderness.      Comments: Minimal forward flexion, abduction, and internal rotation with active ROM to left shoulder.  Full range of motion with extension  Passive ROM left shoulder 30-45 degree with forward flexion and abduction.  Limited internal rotation.   Neurological:      Mental Status: She is alert and oriented to person, place, and time.      Sensory: Sensation is intact.      Motor: No weakness.      Gait: Gait normal.      Comments: 5/5 strength left  and elbow flexion/extension. Unable to test left upper arm strength due to limited ROM.   Psychiatric:         Behavior: Behavior normal.         Assessment & Plan     1. Acute pain of left shoulder  Comments:  Shoulder impingement/tendinitis.  X-ray left shoulder today.  Toradol 30 mg IM today in " "clinic.  Start Relafen this evening. RICE. Ortho referral if no improvement  Orders:  -     ketorolac injection 30 mg  -     nabumetone (RELAFEN) 750 MG tablet; Take 1 tablet (750 mg total) by mouth 2 (two) times daily. for 10 days  Dispense: 20 tablet; Refill: 0  -     X-Ray Shoulder 2 or More Views Left; Future; Expected date: 09/08/2023      Administrations This Visit       ketorolac injection 30 mg       Admin Date  09/08/2023 Action  Given Dose  30 mg Route  Intramuscular Administered By  Gladis Molina MA Kelsey Rutherford, FNP-LOLIS      Portions of this note may have been created with voice recognition software. Occasional "wrong-word" or "sound-a-like" substitutions may have occurred due to the inherent limitations of voice recognition software. Please, read the note carefully and recognize, using context, where substitutions have occurred.     "

## 2023-09-11 ENCOUNTER — TELEPHONE (OUTPATIENT)
Dept: INTERNAL MEDICINE | Facility: CLINIC | Age: 63
End: 2023-09-11
Payer: MEDICARE

## 2023-09-11 NOTE — TELEPHONE ENCOUNTER
----- Message from Anny Baig sent at 9/11/2023  9:57 AM CDT -----  Regarding: Attn Nurse Arceo  Contact: Julio  Type:  Patient Returning Call    Who Called: Julio   Who Left Message for Patient:  Does the patient know what this is regarding?:  Would the patient rather a call back or a response via [facility]? call  Best Call Back Number: [phone] (home)    Additional Information:   Julio is returning the missed call today and would like to speak to you today please.

## 2023-10-19 ENCOUNTER — PATIENT MESSAGE (OUTPATIENT)
Dept: INTERNAL MEDICINE | Facility: CLINIC | Age: 63
End: 2023-10-19
Payer: MEDICARE

## 2023-11-08 ENCOUNTER — HOSPITAL ENCOUNTER (OUTPATIENT)
Dept: RADIOLOGY | Facility: HOSPITAL | Age: 63
Discharge: HOME | End: 2023-11-08
Attending: STUDENT IN AN ORGANIZED HEALTH CARE EDUCATION/TRAINING PROGRAM
Payer: MEDICARE

## 2023-11-08 VITALS — HEIGHT: 63 IN | WEIGHT: 176.38 LBS | BODY MASS INDEX: 31.25 KG/M2

## 2023-11-08 DIAGNOSIS — R92.8 ABNORMAL MAMMOGRAM: ICD-10-CM

## 2023-11-08 PROCEDURE — 77061 BREAST TOMOSYNTHESIS UNI: CPT | Mod: 26,RT,, | Performed by: RADIOLOGY

## 2023-11-08 PROCEDURE — 77065 MAMMO DIGITAL DIAGNOSTIC RIGHT WITH TOMO: ICD-10-PCS | Mod: 26,RT,, | Performed by: RADIOLOGY

## 2023-11-08 PROCEDURE — 77065 DX MAMMO INCL CAD UNI: CPT | Mod: 26,RT,, | Performed by: RADIOLOGY

## 2023-11-08 PROCEDURE — 77065 DX MAMMO INCL CAD UNI: CPT | Mod: TC,RT

## 2023-11-08 PROCEDURE — 77061 MAMMO DIGITAL DIAGNOSTIC RIGHT WITH TOMO: ICD-10-PCS | Mod: 26,RT,, | Performed by: RADIOLOGY

## 2024-01-02 ENCOUNTER — OFFICE VISIT (OUTPATIENT)
Dept: INTERNAL MEDICINE | Facility: CLINIC | Age: 64
End: 2024-01-02
Payer: MEDICARE

## 2024-01-02 ENCOUNTER — PATIENT OUTREACH (OUTPATIENT)
Dept: ADMINISTRATIVE | Facility: HOSPITAL | Age: 64
End: 2024-01-02
Payer: MEDICARE

## 2024-01-02 VITALS
OXYGEN SATURATION: 97 % | DIASTOLIC BLOOD PRESSURE: 78 MMHG | HEIGHT: 63 IN | HEART RATE: 84 BPM | BODY MASS INDEX: 32.58 KG/M2 | WEIGHT: 183.88 LBS | SYSTOLIC BLOOD PRESSURE: 152 MMHG | TEMPERATURE: 97 F

## 2024-01-02 DIAGNOSIS — I10 PRIMARY HYPERTENSION: Primary | ICD-10-CM

## 2024-01-02 DIAGNOSIS — E66.09 CLASS 1 OBESITY DUE TO EXCESS CALORIES WITH BODY MASS INDEX (BMI) OF 32.0 TO 32.9 IN ADULT, UNSPECIFIED WHETHER SERIOUS COMORBIDITY PRESENT: ICD-10-CM

## 2024-01-02 PROCEDURE — 3077F SYST BP >= 140 MM HG: CPT | Mod: CPTII,S$GLB,, | Performed by: NURSE PRACTITIONER

## 2024-01-02 PROCEDURE — 1159F MED LIST DOCD IN RCRD: CPT | Mod: CPTII,S$GLB,, | Performed by: NURSE PRACTITIONER

## 2024-01-02 PROCEDURE — 3288F FALL RISK ASSESSMENT DOCD: CPT | Mod: CPTII,S$GLB,, | Performed by: NURSE PRACTITIONER

## 2024-01-02 PROCEDURE — 99999 PR PBB SHADOW E&M-EST. PATIENT-LVL III: CPT | Mod: PBBFAC,,, | Performed by: NURSE PRACTITIONER

## 2024-01-02 PROCEDURE — 1101F PT FALLS ASSESS-DOCD LE1/YR: CPT | Mod: CPTII,S$GLB,, | Performed by: NURSE PRACTITIONER

## 2024-01-02 PROCEDURE — 3008F BODY MASS INDEX DOCD: CPT | Mod: CPTII,S$GLB,, | Performed by: NURSE PRACTITIONER

## 2024-01-02 PROCEDURE — 1126F AMNT PAIN NOTED NONE PRSNT: CPT | Mod: CPTII,S$GLB,, | Performed by: NURSE PRACTITIONER

## 2024-01-02 PROCEDURE — 1160F RVW MEDS BY RX/DR IN RCRD: CPT | Mod: CPTII,S$GLB,, | Performed by: NURSE PRACTITIONER

## 2024-01-02 PROCEDURE — 99214 OFFICE O/P EST MOD 30 MIN: CPT | Mod: S$GLB,,, | Performed by: NURSE PRACTITIONER

## 2024-01-02 PROCEDURE — 3078F DIAST BP <80 MM HG: CPT | Mod: CPTII,S$GLB,, | Performed by: NURSE PRACTITIONER

## 2024-01-02 RX ORDER — AMLODIPINE BESYLATE 5 MG/1
5 TABLET ORAL DAILY
Qty: 30 TABLET | Refills: 0 | Status: SHIPPED | OUTPATIENT
Start: 2024-01-02 | End: 2024-01-30

## 2024-01-02 NOTE — PROGRESS NOTES
Subjective:       Patient ID: Julio Browne is a 63 y.o. female.    Chief Complaint: Hypertension    Mrs. Browne presents a visit for hypertension.  Patient reports that her blood pressure has been elevated for the past few weeks.  Blood pressure on home monitor has been from 140-170s/60-90s. Previously on 5 mg of amlodipine and 12.5 mg of hctz. Stopped after having weight loss with ozempic which improved control of HTN.     Hypertension  This is a chronic problem. The current episode started more than 1 year ago. The problem has been gradually worsening since onset. The problem is uncontrolled. Pertinent negatives include no anxiety, chest pain, headaches, malaise/fatigue, palpitations, peripheral edema or shortness of breath. There are no associated agents to hypertension. Risk factors for coronary artery disease include post-menopausal state and sedentary lifestyle. Past treatments include diuretics and calcium channel blockers. The current treatment provides significant improvement. There is no history of angina, kidney disease, CAD/MI, CVA, heart failure, left ventricular hypertrophy, PVD or retinopathy.       Patient Active Problem List   Diagnosis    Primary hypertension    Class 1 obesity due to excess calories with body mass index (BMI) of 32.0 to 32.9 in adult       Family History   Problem Relation Age of Onset    Breast cancer Maternal Grandmother      Past Surgical History:   Procedure Laterality Date    BREAST BIOPSY      EYE SURGERY           Current Outpatient Medications:     amLODIPine (NORVASC) 5 MG tablet, Take 1 tablet (5 mg total) by mouth once daily., Disp: 30 tablet, Rfl: 0    calcium carbonate-vit D3-min 600 mg calcium- 400 unit Tab, Take 1 tablet by mouth 2 (two) times a day., Disp: 180 tablet, Rfl: 0    Review of Systems   Constitutional:  Negative for appetite change, chills, fatigue, fever and malaise/fatigue.   Eyes:  Negative for visual disturbance.   Respiratory:  Negative for cough  "and shortness of breath.    Cardiovascular:  Negative for chest pain and palpitations.   Gastrointestinal:  Negative for abdominal pain, diarrhea, nausea and vomiting.   Neurological:  Negative for dizziness, syncope, speech difficulty, light-headedness, numbness and headaches.       Objective:   BP (!) 152/78 (BP Location: Left arm, Patient Position: Sitting, BP Method: Medium (Manual))   Pulse 84   Temp 97.3 °F (36.3 °C) (Tympanic)   Ht 5' 3" (1.6 m)   Wt 83.4 kg (183 lb 13.8 oz)   SpO2 97%   BMI 32.57 kg/m²      Physical Exam  Vitals reviewed.   Constitutional:       General: She is not in acute distress.     Appearance: Normal appearance. She is well-developed. She is not ill-appearing or diaphoretic.   HENT:      Head: Normocephalic.   Cardiovascular:      Rate and Rhythm: Normal rate and regular rhythm.      Pulses: Normal pulses.      Heart sounds: Normal heart sounds. No murmur heard.     No friction rub. No gallop.   Pulmonary:      Effort: Pulmonary effort is normal. No respiratory distress.      Breath sounds: Normal breath sounds. No rales.   Musculoskeletal:      Cervical back: Neck supple.   Skin:     General: Skin is warm and dry.      Coloration: Skin is not pale.      Findings: No erythema.   Neurological:      Mental Status: She is alert and oriented to person, place, and time.         Assessment & Plan     Problem List Items Addressed This Visit          Cardiac/Vascular    Primary hypertension - Primary    Current Assessment & Plan     Tolerated amlodipine in past. Has old rx at home. Starting on 5 mg amlodipine for now, can increase to 10 mg in 1 week if no improvement of BP. Has follow up scheduled in 2 weeks with PCP. Goal BP discussed with pt <140/90.         Relevant Medications    amLODIPine (NORVASC) 5 MG tablet       Endocrine    Class 1 obesity due to excess calories with body mass index (BMI) of 32.0 to 32.9 in adult    Current Assessment & Plan     Has had some weight gain since " "being off of ozempic. Possible contributor of worsening HTN.               Follow up in about 2 weeks (around 1/16/2024) for hypertension .        Portions of this note may have been created with voice recognition software. Occasional "wrong-word" or "sound-a-like" substitutions may have occurred due to the inherent limitations of voice recognition software. Please, read the note carefully and recognize, using context, where substitutions have occurred.       "

## 2024-01-02 NOTE — ASSESSMENT & PLAN NOTE
Tolerated amlodipine in past. Has old rx at home. Starting on 5 mg amlodipine for now, can increase to 10 mg in 1 week if no improvement of BP. Has follow up scheduled in 2 weeks with PCP. Goal BP discussed with pt <140/90.

## 2024-01-16 ENCOUNTER — OFFICE VISIT (OUTPATIENT)
Dept: INTERNAL MEDICINE | Facility: CLINIC | Age: 64
End: 2024-01-16
Payer: MEDICARE

## 2024-01-16 VITALS
OXYGEN SATURATION: 97 % | HEART RATE: 88 BPM | DIASTOLIC BLOOD PRESSURE: 72 MMHG | BODY MASS INDEX: 33.04 KG/M2 | HEIGHT: 63 IN | TEMPERATURE: 97 F | SYSTOLIC BLOOD PRESSURE: 130 MMHG | WEIGHT: 186.5 LBS

## 2024-01-16 DIAGNOSIS — E66.09 CLASS 1 OBESITY DUE TO EXCESS CALORIES WITH BODY MASS INDEX (BMI) OF 32.0 TO 32.9 IN ADULT, UNSPECIFIED WHETHER SERIOUS COMORBIDITY PRESENT: Primary | ICD-10-CM

## 2024-01-16 DIAGNOSIS — I10 PRIMARY HYPERTENSION: ICD-10-CM

## 2024-01-16 PROCEDURE — 99213 OFFICE O/P EST LOW 20 MIN: CPT | Mod: S$GLB,,, | Performed by: STUDENT IN AN ORGANIZED HEALTH CARE EDUCATION/TRAINING PROGRAM

## 2024-01-16 PROCEDURE — 3078F DIAST BP <80 MM HG: CPT | Mod: CPTII,S$GLB,, | Performed by: STUDENT IN AN ORGANIZED HEALTH CARE EDUCATION/TRAINING PROGRAM

## 2024-01-16 PROCEDURE — 1126F AMNT PAIN NOTED NONE PRSNT: CPT | Mod: CPTII,S$GLB,, | Performed by: STUDENT IN AN ORGANIZED HEALTH CARE EDUCATION/TRAINING PROGRAM

## 2024-01-16 PROCEDURE — 99999 PR PBB SHADOW E&M-EST. PATIENT-LVL III: CPT | Mod: PBBFAC,,, | Performed by: STUDENT IN AN ORGANIZED HEALTH CARE EDUCATION/TRAINING PROGRAM

## 2024-01-16 PROCEDURE — 3075F SYST BP GE 130 - 139MM HG: CPT | Mod: CPTII,S$GLB,, | Performed by: STUDENT IN AN ORGANIZED HEALTH CARE EDUCATION/TRAINING PROGRAM

## 2024-01-16 PROCEDURE — 1101F PT FALLS ASSESS-DOCD LE1/YR: CPT | Mod: CPTII,S$GLB,, | Performed by: STUDENT IN AN ORGANIZED HEALTH CARE EDUCATION/TRAINING PROGRAM

## 2024-01-16 PROCEDURE — 3008F BODY MASS INDEX DOCD: CPT | Mod: CPTII,S$GLB,, | Performed by: STUDENT IN AN ORGANIZED HEALTH CARE EDUCATION/TRAINING PROGRAM

## 2024-01-16 PROCEDURE — 1159F MED LIST DOCD IN RCRD: CPT | Mod: CPTII,S$GLB,, | Performed by: STUDENT IN AN ORGANIZED HEALTH CARE EDUCATION/TRAINING PROGRAM

## 2024-01-16 PROCEDURE — 3288F FALL RISK ASSESSMENT DOCD: CPT | Mod: CPTII,S$GLB,, | Performed by: STUDENT IN AN ORGANIZED HEALTH CARE EDUCATION/TRAINING PROGRAM

## 2024-01-16 RX ORDER — TIRZEPATIDE 2.5 MG/.5ML
2.5 INJECTION, SOLUTION SUBCUTANEOUS
Qty: 4 PEN | Refills: 0 | Status: SHIPPED | OUTPATIENT
Start: 2024-01-16 | End: 2024-02-13

## 2024-01-16 NOTE — PROGRESS NOTES
Chief Complaint   Patient presents with    Hypertension     HPI: Julio Browne is a 63 y.o. female  with Pmhx listed below who presents to clinic for evaluation of blood pressure.She was seen  on 01/02/2024 during which her blood pressure was found to be elevated. Her medication was adjusted and Norvasc was added to her regimen. She was asked to keep bp log and was told to bring it back in 2 weeks visit. Review of the Bp log reveals that his blood pressure is well controlled. He denies having chest pain, headache, dizziness, palpitation, syncopal episode, blurry vision, leg swelling and other complains at present. No other issues and concerns today.          Problem List:  Patient Active Problem List   Diagnosis    Primary hypertension    Class 1 obesity due to excess calories with body mass index (BMI) of 32.0 to 32.9 in adult     ROS: Negative except as noted above.     Current Meds:  Current Outpatient Medications   Medication Sig Dispense Refill    amLODIPine (NORVASC) 5 MG tablet Take 1 tablet (5 mg total) by mouth once daily. 30 tablet 0    calcium carbonate-vit D3-min 600 mg calcium- 400 unit Tab Take 1 tablet by mouth 2 (two) times a day. 180 tablet 0     No current facility-administered medications for this visit.      PE:  BP: 130/72  Pulse: 88     Temp: 97.2 °F (36.2 °C)  Weight: 84.6 kg (186 lb 8.2 oz) Body mass index is 33.04 kg/m².    Wt Readings from Last 5 Encounters:   01/16/24 84.6 kg (186 lb 8.2 oz)   01/02/24 83.4 kg (183 lb 13.8 oz)   11/08/23 80 kg (176 lb 5.9 oz)   09/08/23 80.2 kg (176 lb 12.9 oz)   08/03/23 80.8 kg (178 lb 2.1 oz)     General appearance: alert and cooperative, not in acute distress  Head: normocephalic, without obvious abnormality, atraumatic  Eyes: conjunctivae/corneas clear. PERRL, EOM's intact.  Ears: clear tympanic membranes   Neck: no adenopathy, supple, symmetrical, trachea midline and thyroid not enlarged, symmetric, no tenderness/mass/nodules, no JVD  Throat: lips,  mucosa, and tongue normal; teeth and gums normal; no thrush  Chest: no reproducible chest pain   Heart: regular rate and rhythm, S1, S2 normal, no murmur, click, rub or gallop  Lungs: unlabored respiration, bilateral equal air entry, normal vesicular breath sound heard, no wheezing, rhonchi   Abdomen: soft, non-tender, non-distended; bowel sounds +; no masses,  no organomegaly, no ascites   Extremities: normal, atraumatic, no cyanosis or edema noted B/L upper and lower extremities.  Skin: skin color, texture, turgor normal. No rashes or lesions noted.  Neurologic: grossly intact        Lab:  Lab Results   Component Value Date    WBC 7.49 07/28/2023    HGB 13.3 07/28/2023    HCT 39.6 07/28/2023    MCV 90 07/28/2023     07/28/2023     07/28/2023    K 3.9 07/28/2023     07/28/2023    CO2 26 07/28/2023    BUN 9 07/28/2023     07/28/2023    CALCIUM 9.3 07/28/2023    MG 1.7 01/19/2023    AST 18 07/28/2023    ALT 20 07/28/2023    CHOL 198 04/10/2023    HDL 71 04/10/2023    LDLCALC 115.8 04/10/2023    TRIG 56 04/10/2023       Impression:  1. Class 1 obesity due to excess calories with body mass index (BMI) of 32.0 to 32.9 in adult, unspecified whether serious comorbidity present  - tirzepatide (MOUNJARO) 2.5 mg/0.5 mL PnIj; Inject 2.5 mg into the skin every 7 days.  Dispense: 4 pen ; Refill: 0    2. Primary hypertension  Low salt diet.  Enouraged to increase in physical activity at least 30 minutes of brisk walking/day , 5 days a week  Advised weight loss    Advised for DASH diet - The Dietary Approaches to Stop Hypertension (DASH) is high in vegetables, fruits, low-fat dairy products, whole grains, poultry, fish, and nuts and low in sweets, sugar-sweetened beverages, and red meats   Stop smoking.  Limit caffeine intake  Limit alcohol intake <3/day for men and <2/day for women.  Advised to be compliant with medication.  Please monitor your blood pressure regularly at home   Return precaution  provided  On norvasc to be continued      Rtc in 3 months for repeat labs and HTN    Gianfranco Amanda MD

## 2024-01-30 DIAGNOSIS — I10 PRIMARY HYPERTENSION: ICD-10-CM

## 2024-01-30 RX ORDER — AMLODIPINE BESYLATE 5 MG/1
5 TABLET ORAL
Qty: 30 TABLET | Refills: 0 | Status: SHIPPED | OUTPATIENT
Start: 2024-01-30 | End: 2024-02-26

## 2024-02-13 ENCOUNTER — PATIENT MESSAGE (OUTPATIENT)
Dept: INTERNAL MEDICINE | Facility: CLINIC | Age: 64
End: 2024-02-13
Payer: MEDICARE

## 2024-02-13 DIAGNOSIS — E66.09 CLASS 1 OBESITY DUE TO EXCESS CALORIES WITH BODY MASS INDEX (BMI) OF 32.0 TO 32.9 IN ADULT, UNSPECIFIED WHETHER SERIOUS COMORBIDITY PRESENT: Primary | ICD-10-CM

## 2024-02-13 RX ORDER — TIRZEPATIDE 5 MG/.5ML
5 INJECTION, SOLUTION SUBCUTANEOUS
Qty: 4 PEN | Refills: 0 | Status: SHIPPED | OUTPATIENT
Start: 2024-02-13 | End: 2024-03-10

## 2024-02-13 RX ORDER — TIRZEPATIDE 2.5 MG/.5ML
2.5 INJECTION, SOLUTION SUBCUTANEOUS
Qty: 4 PEN | Refills: 0 | Status: CANCELLED | OUTPATIENT
Start: 2024-02-13 | End: 2024-03-14

## 2024-02-25 DIAGNOSIS — I10 PRIMARY HYPERTENSION: ICD-10-CM

## 2024-02-26 RX ORDER — AMLODIPINE BESYLATE 5 MG/1
5 TABLET ORAL
Qty: 90 TABLET | Refills: 0 | Status: SHIPPED | OUTPATIENT
Start: 2024-02-26 | End: 2024-05-25

## 2024-02-26 NOTE — TELEPHONE ENCOUNTER
Refill Routing Note   Medication(s) are not appropriate for processing by [facility] for the following reason(s):        Non-participating provider    [facility] action(s):  Route               Appointments  past 12m or future 3m with PCP    Date Provider   Last Visit   1/16/2024 Gianfranco Amanda MD   Next Visit   4/18/2024 Gianfranco Amanda MD   ED visits in past 90 days: 0        Note composed:9:50 AM 02/26/2024

## 2024-03-06 ENCOUNTER — PATIENT MESSAGE (OUTPATIENT)
Dept: ADMINISTRATIVE | Facility: OTHER | Age: 64
End: 2024-03-06
Payer: MEDICARE

## 2024-03-07 DIAGNOSIS — E66.09 CLASS 1 OBESITY DUE TO EXCESS CALORIES WITH BODY MASS INDEX (BMI) OF 32.0 TO 32.9 IN ADULT, UNSPECIFIED WHETHER SERIOUS COMORBIDITY PRESENT: ICD-10-CM

## 2024-03-07 NOTE — TELEPHONE ENCOUNTER
Care Due:                  Date            Visit Type   Department     Provider  --------------------------------------------------------------------------------                                EP -                              PRIMARY      [facility] INTER[facility]  Last Visit: 01-      CARE ([facility])   MEDICINE       Gianfranco  Treasure                              EP -                              PRIMARY      [facility] INTER[facility]  Next Visit: 04-      CARE ([facility])   MEDICINE       [facility]  Treasure                                                            Last  Test          Frequency    Reason                     Performed    Due Date  --------------------------------------------------------------------------------    HBA1C.......  6 months...  tirzepatide..............  04-   10-    [facility] Embedded Care Due Messages. Reference number: [id number].   3/07/2024 10:36:30 AM CST

## 2024-03-07 NOTE — TELEPHONE ENCOUNTER
Refill Routing Note   Medication(s) are not appropriate for processing by [facility] for the following reason(s):        New or recently adjusted medication  Required labs outdated    [facility] action(s):  Defer   Requires labs : Yes               Appointments  past 12m or future 3m with PCP    Date Provider   Last Visit   1/16/2024 Gianfranco Amanda MD   Next Visit   4/18/2024 Gianfranco Amanda MD   ED visits in past 90 days: 0        Note composed:4:05 PM 03/07/2024

## 2024-03-10 RX ORDER — TIRZEPATIDE 5 MG/.5ML
5 INJECTION, SOLUTION SUBCUTANEOUS
Qty: 12 PEN | Refills: 0 | Status: SHIPPED | OUTPATIENT
Start: 2024-03-10 | End: 2024-06-08

## 2024-03-21 ENCOUNTER — HOSPITAL ENCOUNTER (EMERGENCY)
Facility: HOSPITAL | Age: 64
Discharge: HOME | End: 2024-03-21
Attending: EMERGENCY MEDICINE
Payer: MEDICARE

## 2024-03-21 VITALS
SYSTOLIC BLOOD PRESSURE: 157 MMHG | RESPIRATION RATE: 16 BRPM | OXYGEN SATURATION: 98 % | DIASTOLIC BLOOD PRESSURE: 78 MMHG | HEART RATE: 88 BPM | TEMPERATURE: 98 F | WEIGHT: 176.56 LBS | BODY MASS INDEX: 31.28 KG/M2

## 2024-03-21 DIAGNOSIS — R11.2 NAUSEA AND VOMITING, UNSPECIFIED VOMITING TYPE: Primary | ICD-10-CM

## 2024-03-21 DIAGNOSIS — R53.1 WEAKNESS: ICD-10-CM

## 2024-03-21 LAB
ALBUMIN SERPL BCP-MCNC: 4.1 G/DL (ref 3.5–5.2)
ALP SERPL-CCNC: 56 U/L (ref 55–135)
ALT SERPL W/O P-5'-P-CCNC: 20 U/L (ref 10–44)
ANION GAP SERPL CALC-SCNC: 14 MMOL/L (ref 8–16)
AST SERPL-CCNC: 21 U/L (ref 10–40)
BASOPHILS # BLD AUTO: 0.02 K/UL (ref 0–0.2)
BASOPHILS NFR BLD: 0.2 % (ref 0–1.9)
BILIRUB SERPL-MCNC: 0.6 MG/DL (ref 0.1–1)
BILIRUB UR QL STRIP: NEGATIVE
BUN SERPL-MCNC: 12 MG/DL (ref 8–23)
CALCIUM SERPL-MCNC: 9.3 MG/DL (ref 8.7–10.5)
CHLORIDE SERPL-SCNC: 103 MMOL/L (ref 95–110)
CLARITY UR REFRACT.AUTO: CLEAR
CO2 SERPL-SCNC: 22 MMOL/L (ref 23–29)
COLOR UR AUTO: YELLOW
CREAT SERPL-MCNC: 0.8 MG/DL (ref 0.5–1.4)
DIFFERENTIAL METHOD BLD: ABNORMAL
EOSINOPHIL # BLD AUTO: 0 K/UL (ref 0–0.5)
EOSINOPHIL NFR BLD: 0.3 % (ref 0–8)
ERYTHROCYTE [DISTWIDTH] IN BLOOD BY AUTOMATED COUNT: 12.1 % (ref 11.5–14.5)
EST. GFR  (NO RACE VARIABLE): >60 ML/MIN/1.73 M^2
GLUCOSE SERPL-MCNC: 109 MG/DL (ref 70–110)
GLUCOSE UR QL STRIP: NEGATIVE
HCT VFR BLD AUTO: 40.9 % (ref 37–48.5)
HEP C VIRUS HOLD SPECIMEN: NORMAL
HGB BLD-MCNC: 14.1 G/DL (ref 12–16)
HGB UR QL STRIP: ABNORMAL
IMM GRANULOCYTES # BLD AUTO: 0.03 K/UL (ref 0–0.04)
IMM GRANULOCYTES NFR BLD AUTO: 0.3 % (ref 0–0.5)
KETONES UR QL STRIP: ABNORMAL
LEUKOCYTE ESTERASE UR QL STRIP: NEGATIVE
LIPASE SERPL-CCNC: 18 U/L (ref 4–60)
LYMPHOCYTES # BLD AUTO: 1.6 K/UL (ref 1–4.8)
LYMPHOCYTES NFR BLD: 18.1 % (ref 18–48)
MAGNESIUM SERPL-MCNC: 1.7 MG/DL (ref 1.6–2.6)
MCH RBC QN AUTO: 30.1 PG (ref 27–31)
MCHC RBC AUTO-ENTMCNC: 34.5 G/DL (ref 32–36)
MCV RBC AUTO: 87 FL (ref 82–98)
MONOCYTES # BLD AUTO: 0.4 K/UL (ref 0.3–1)
MONOCYTES NFR BLD: 4.4 % (ref 4–15)
NEUTROPHILS # BLD AUTO: 6.6 K/UL (ref 1.8–7.7)
NEUTROPHILS NFR BLD: 76.7 % (ref 38–73)
NITRITE UR QL STRIP: NEGATIVE
NRBC BLD-RTO: 0 /100 WBC
OHS QRS DURATION: 84 MS
OHS QTC CALCULATION: 450 MS
PH UR STRIP: 8 [PH] (ref 5–8)
PLATELET # BLD AUTO: 289 K/UL (ref 150–450)
PMV BLD AUTO: 10.8 FL (ref 9.2–12.9)
POTASSIUM SERPL-SCNC: 3.5 MMOL/L (ref 3.5–5.1)
PROT SERPL-MCNC: 7.8 G/DL (ref 6–8.4)
PROT UR QL STRIP: NEGATIVE
RBC # BLD AUTO: 4.69 M/UL (ref 4–5.4)
SODIUM SERPL-SCNC: 139 MMOL/L (ref 136–145)
SP GR UR STRIP: 1.02 (ref 1–1.03)
TROPONIN I SERPL DL<=0.01 NG/ML-MCNC: <0.006 NG/ML (ref 0–0.03)
TROPONIN I SERPL DL<=0.01 NG/ML-MCNC: <0.006 NG/ML (ref 0–0.03)
URN SPEC COLLECT METH UR: ABNORMAL
UROBILINOGEN UR STRIP-ACNC: NEGATIVE EU/DL
WBC # BLD AUTO: 8.6 K/UL (ref 3.9–12.7)

## 2024-03-21 PROCEDURE — 87389 HIV-1 AG W/HIV-1&-2 AB AG IA: CPT | Performed by: EMERGENCY MEDICINE

## 2024-03-21 PROCEDURE — 80053 COMPREHEN METABOLIC PANEL: CPT | Mod: ER | Performed by: EMERGENCY MEDICINE

## 2024-03-21 PROCEDURE — 93005 ELECTROCARDIOGRAM TRACING: CPT | Mod: ER

## 2024-03-21 PROCEDURE — 86803 HEPATITIS C AB TEST: CPT | Performed by: EMERGENCY MEDICINE

## 2024-03-21 PROCEDURE — 83735 ASSAY OF MAGNESIUM: CPT | Mod: ER | Performed by: EMERGENCY MEDICINE

## 2024-03-21 PROCEDURE — 83690 ASSAY OF LIPASE: CPT | Mod: ER | Performed by: EMERGENCY MEDICINE

## 2024-03-21 PROCEDURE — 93010 ELECTROCARDIOGRAM REPORT: CPT | Mod: ,,, | Performed by: INTERNAL MEDICINE

## 2024-03-21 PROCEDURE — 85025 COMPLETE CBC W/AUTO DIFF WBC: CPT | Mod: ER | Performed by: EMERGENCY MEDICINE

## 2024-03-21 PROCEDURE — 63600175 PHARM REV CODE 636 W HCPCS: Mod: ER | Performed by: EMERGENCY MEDICINE

## 2024-03-21 PROCEDURE — 25000003 PHARM REV CODE 250: Mod: ER | Performed by: EMERGENCY MEDICINE

## 2024-03-21 PROCEDURE — 96365 THER/PROPH/DIAG IV INF INIT: CPT | Mod: ER

## 2024-03-21 PROCEDURE — 99284 EMERGENCY DEPT VISIT MOD MDM: CPT | Mod: 25,ER

## 2024-03-21 PROCEDURE — 81003 URINALYSIS AUTO W/O SCOPE: CPT | Mod: ER | Performed by: EMERGENCY MEDICINE

## 2024-03-21 PROCEDURE — 84484 ASSAY OF TROPONIN QUANT: CPT | Mod: ER | Performed by: EMERGENCY MEDICINE

## 2024-03-21 RX ORDER — ONDANSETRON 4 MG/1
4 TABLET, ORALLY DISINTEGRATING ORAL EVERY 6 HOURS PRN
Qty: 12 TABLET | Refills: 0 | Status: SHIPPED | OUTPATIENT
Start: 2024-03-21

## 2024-03-21 RX ADMIN — SODIUM CHLORIDE 1000 ML: 9 INJECTION, SOLUTION INTRAVENOUS at 12:03

## 2024-03-21 RX ADMIN — PROMETHAZINE HYDROCHLORIDE 12.5 MG: 25 INJECTION INTRAMUSCULAR; INTRAVENOUS at 12:03

## 2024-03-21 NOTE — Clinical Note
"Julio"Den Browne was seen and treated in our emergency department on 3/21/2024.  She may return to work on 03/25/2024.       If you have any questions or concerns, please don't hesitate to call.      Destiny Dueñas, DO"

## 2024-03-21 NOTE — ED PROVIDER NOTES
Emergency Medicine Provider Note - 3/21/2024       History     Chief Complaint   Patient presents with    Emesis     Started 30 min PTA; 2 small episodes; on monjoro x3 months, not diabetic, on it for weight loss.       Allergies:  Review of patient's allergies indicates:  No Known Allergies     History of Present Illness   HPI    3/21/2024, 12:30 PM  The history is provided by the patient    Julio Browne is a 64 y.o. female presenting to the ED for fatigue.  Patient is concerned that her potassium levels may be low.  She reports that she has these episodes of feeling hot,  nauseated and generalized fatigue that comes over her.  She is currently on vaginal for weight loss.  She has had similar episodes prior.  Patient denies any chest pain, chest pressure, indigestion, vomiting, fever, chills, sore throat, abdominal pain, diarrhea, dysuria, urgency, frequency, headache, numbness or weakness to 1 side, or difficulty talking.      Arrival mode: Private Vehicle     PCP: Gianfranco Amanda MD     Past Medical History:  No past medical history on file.    Past Surgical History:  Past Surgical History:   Procedure Laterality Date    BREAST BIOPSY      EYE SURGERY           Family History:  Family History   Problem Relation Age of Onset    Breast cancer Maternal Grandmother        Social History:  Social History     Tobacco Use    Smoking status: Never    Smokeless tobacco: Never   Substance and Sexual Activity    Alcohol use: Yes     Comment: seldom    Drug use: Never    Sexual activity: Not on file        Review of Systems   Review of Systems   Constitutional:  Positive for fatigue. Negative for fever.   HENT:  Negative for sore throat.    Respiratory:  Negative for cough and shortness of breath.    Cardiovascular:  Negative for chest pain and leg swelling.   Gastrointestinal:  Positive for nausea. Negative for abdominal pain and vomiting.   Genitourinary:  Negative for dysuria.   Musculoskeletal:  Negative for back  pain.   Skin:  Negative for rash.   Neurological:  Negative for dizziness, speech difficulty, weakness (Generalized weakness), light-headedness and headaches.   Hematological:  Does not bruise/bleed easily.          Physical Exam     Initial Vitals   BP Pulse Resp Temp SpO2   03/21/24 1117 03/21/24 1117 03/21/24 1117 03/21/24 1116 03/21/24 1117   (!) 157/78 88 16 97.8 °F (36.6 °C) 98 %      MAP       --                 Physical Exam    Nursing Notes and Vital Signs Reviewed.  Constitutional: Patient is in no apparent distress. Well-developed and well-nourished.  Head: Atraumatic. Normocephalic.  Eyes: PERRL. EOM intact. Conjunctivae are not pale. No scleral icterus.  ENT: Mucous membranes are moist. Oropharynx is clear and symmetric.    Neck: Supple. Full ROM. No lymphadenopathy.  Cardiovascular: Regular rate. Regular rhythm. No murmurs, rubs, or gallops. Distal pulses are 2+ and symmetric.  Pulmonary/Chest: No respiratory distress. Clear to auscultation bilaterally. No wheezing or rales.  Abdominal: Soft and non-distended.  There is no tenderness.  No rebound, guarding, or rigidity. Good bowel sounds.  Genitourinary: No CVA tenderness  Musculoskeletal: Moves all extremities. No obvious deformities. No edema. No calf tenderness.  Skin: Warm and dry.  Neurological:  Alert, awake, and appropriate.  Normal speech.  No acute focal neurological deficits are appreciated.  Psychiatric: Normal affect. Good eye contact. Appropriate in content.     ED Course   ED Procedures:  Procedures    ED Vital Signs:  Vitals:    03/21/24 1116 03/21/24 1117   BP:  (!) 157/78   Pulse:  88   Resp:  16   Temp: 97.8 °F (36.6 °C)    TempSrc: Oral    SpO2:  98%   Weight: 80.1 kg (176 lb 9.4 oz)        Abnormal Lab Results:  Labs Reviewed   CBC W/ AUTO DIFFERENTIAL - Abnormal; Notable for the following components:       Result Value    Gran % 76.7 (*)     All other components within normal limits   COMPREHENSIVE METABOLIC PANEL - Abnormal;  Notable for the following components:    CO2 22 (*)     All other components within normal limits   URINALYSIS, REFLEX TO URINE CULTURE - Abnormal; Notable for the following components:    Ketones, UA Trace (*)     Occult Blood UA Trace (*)     All other components within normal limits    Narrative:     Specimen Source->Urine   LIPASE   MAGNESIUM   TROPONIN I   TROPONIN I   TROPONIN I   HIV 1 / 2 ANTIBODY   HEPATITIS C ANTIBODY   HEP C VIRUS HOLD SPECIMEN        All Lab Results:  Results for orders placed or performed during the hospital encounter of 03/21/24   CBC W/ AUTO DIFFERENTIAL   Result Value Ref Range    WBC 8.60 3.90 - 12.70 K/uL    RBC 4.69 4.00 - 5.40 M/uL    Hemoglobin 14.1 12.0 - 16.0 g/dL    Hematocrit 40.9 37.0 - 48.5 %    MCV 87 82 - 98 fL    MCH 30.1 27.0 - 31.0 pg    MCHC 34.5 32.0 - 36.0 g/dL    RDW 12.1 11.5 - 14.5 %    Platelets 289 150 - 450 K/uL    MPV 10.8 9.2 - 12.9 fL    Immature Granulocytes 0.3 0.0 - 0.5 %    Gran # (ANC) 6.6 1.8 - 7.7 K/uL    Immature Grans (Abs) 0.03 0.00 - 0.04 K/uL    Lymph # 1.6 1.0 - 4.8 K/uL    Mono # 0.4 0.3 - 1.0 K/uL    Eos # 0.0 0.0 - 0.5 K/uL    Baso # 0.02 0.00 - 0.20 K/uL    nRBC 0 0 /100 WBC    Gran % 76.7 (H) 38.0 - 73.0 %    Lymph % 18.1 18.0 - 48.0 %    Mono % 4.4 4.0 - 15.0 %    Eosinophil % 0.3 0.0 - 8.0 %    Basophil % 0.2 0.0 - 1.9 %    Differential Method Automated    Comp. Metabolic Panel   Result Value Ref Range    Sodium 139 136 - 145 mmol/L    Potassium 3.5 3.5 - 5.1 mmol/L    Chloride 103 95 - 110 mmol/L    CO2 22 (L) 23 - 29 mmol/L    Glucose 109 70 - 110 mg/dL    BUN 12 8 - 23 mg/dL    Creatinine 0.8 0.5 - 1.4 mg/dL    Calcium 9.3 8.7 - 10.5 mg/dL    Total Protein 7.8 6.0 - 8.4 g/dL    Albumin 4.1 3.5 - 5.2 g/dL    Total Bilirubin 0.6 0.1 - 1.0 mg/dL    Alkaline Phosphatase 56 55 - 135 U/L    AST 21 10 - 40 U/L    ALT 20 10 - 44 U/L    eGFR >60.0 >60 mL/min/1.73 m^2    Anion Gap 14 8 - 16 mmol/L   Lipase   Result Value Ref Range    Lipase 18  4 - 60 U/L   Urinalysis, Reflex to Urine Culture Urine, Clean Catch    Specimen: Urine   Result Value Ref Range    Specimen UA Urine, Clean Catch     Color, UA Yellow Yellow, Straw, Jessica    Appearance, UA Clear Clear    pH, UA 8.0 5.0 - 8.0    Specific Gravity, UA 1.020 1.005 - 1.030    Protein, UA Negative Negative    Glucose, UA Negative Negative    Ketones, UA Trace (A) Negative    Bilirubin (UA) Negative Negative    Occult Blood UA Trace (A) Negative    Nitrite, UA Negative Negative    Urobilinogen, UA Negative <2.0 EU/dL    Leukocytes, UA Negative Negative   Magnesium   Result Value Ref Range    Magnesium 1.7 1.6 - 2.6 mg/dL   Troponin I   Result Value Ref Range    Troponin I <0.006 0.000 - 0.026 ng/mL   Troponin I   Result Value Ref Range    Troponin I <0.006 0.000 - 0.026 ng/mL   EKG 12-lead   Result Value Ref Range    QRS Duration 84 ms    [facility] QTC Calculation 450 ms         Reviewed Prior Labs:  none    The EKG was ordered, reviewed, and independently interpreted by the ED provider:      ECG Results              EKG 12-lead (Final result)        Collection Time Result Time QRS Duration [facility] QTC Calculation    03/21/24 12:24:58 03/21/24 16:51:13 84 450                     Final result by Interface, Lab In [facility] (03/21/24 16:51:27)                   Narrative:    Test Reason : R53.1,    Vent. Rate : 081 BPM     Atrial Rate : 081 BPM     P-R Int : 144 ms          QRS Dur : 084 ms      QT Int : 388 ms       P-R-T Axes : 044 043 055 degrees     QTc Int : 450 ms    Sinus rhythm with Premature atrial complexes with Aberrant conduction  Low voltage QRS  Borderline Abnormal ECG  When compared with ECG of 19-JUN-2023 15:07,  Premature ventricular complexes are no longer Present  Aberrant conduction is now Present  Confirmed by TYRON KUMAR MD ([id number]) on 3/21/2024 4:51:11 PM    Referred By: [email or web address]   SELF           Confirmed By:TYRON KUMAR MD                      Wet Read by Destiny Dueñas DO (03/21/24 12:55:35,  [facility] - Emergency Dept, Emergency Medicine)    Rate of 81 beats per minute.  Sinus rhythm.  Normal axis.  PAC.  Low voltage QRS.  No ST segment elevation.  No STEMI.  This is compared to previous EKG dated June 19, 2023 no acute changes.                                    Imaging Results:  Imaging Results    None               The Emergency Provider reviewed the vital signs and test results, which are outlined above.     ED Discussion   ED Medication(s):  Medications   sodium chloride 0.9% bolus 1,000 mL 1,000 mL (0 mLs Intravenous Stopped 3/21/24 1306)   promethazine (PHENERGAN) 12.5 mg in dextrose 5 % (D5W) 50 mL IVPB (0 mg Intravenous Stopped 3/21/24 1253)       ED Course as of 03/21/24 1655   Thu Mar 21, 2024   1344 Repeat exam:  Soft, no rebound, no masses.  Patient reports that she is feeling better.  Patient denies any abdominal pain, chest pain, chest pressure, shortness of breath, difficulty breathing.  Plan of care will be to repeat troponin at approximately 3:30 p.m. [LB]   1350 Troponin I: <0.006 [LB]   1604 Troponin I: <0.006 [LB]   1632 Return precautions given.  Patient verbalized understanding. [LB]      ED Course User Index  [LB] Destiny Dueñas, DO            1632 Reassessment: Dr. Dueñas reassessed the pt.  The pt is resting comfortably and is NAD.  Pt states their sx have improved. Discussed test results, shared treatment plan, specific conditions for return, and the need for f/u.  Answered their questions at this time.  Pt understands and agrees to the plan.  The pt has remained hemodynamically stable through ED course and is stable for discharge.    I discussed with patient and/or family/caretaker that evaluation in the ED does not suggest any emergent or life threatening medical conditions requiring immediate intervention beyond what was provided in the ED, and I believe patient is safe for discharge.  Regardless, an unremarkable evaluation in the ED does not preclude the development  or presence of a serious of life threatening condition. As such, patient was instructed to return immediately for any worsening or change in current symptoms.     MIPS Measures     Smoker? No     Hypertension: History of Hypertension: The patient has elevated blood pressure (higher than 120/80) while being treated in the ED but has a history of hypertension.       Medical Decision Making                 Medical Decision Making  Differential diagnosis includes:  Urinary tract infection, electrolyte abnormality, arrhythmia, atypical ACS    Labs: Urinalysis notable for trace ketones, occult blood.  WBC 8.60, h/h 14.1/40.9, potassium 3.5.  BUN/creatinine 5/0.8.  Lipase 18, Mag 1.6.  Serial trop 0.006 x 2.  EKG:  No STEMI noted.    Patient given normal saline bolus.  Phenergan 12.5 mg IV.  Patient able to tolerate p.o..  Patient had improvement of symptoms.  Discussed with patient and family members specific lack specific diagnosis.  Could be related to Mounjaro.  Discussed indications to return emergency department including chest pain, chest pressure, shortness breath, indigestion.  Outpatient ambulatory referral to Cardiology made.      Amount and/or Complexity of Data Reviewed  Labs: ordered. Decision-making details documented in ED Course.  ECG/medicine tests: ordered and independent interpretation performed. Decision-making details documented in ED Course.    Risk  Prescription drug management.  Risk Details: Discharge Medication List as of 3/21/2024  4:35 PM    START taking these medications    ondansetron (ZOFRAN-ODT) 4 MG TbDL  Take 1 tablet (4 mg total) by mouth every 6 (six) hours as needed (nausea)., Starting Thu 3/21/2024, Normal                Coding    Prescription Management: I performed a review of the patient's current Rx medication list as input by nursing staff.    Discharge Medication List as of 3/21/2024  4:35 PM        START taking these medications    Details   ondansetron (ZOFRAN-ODT) 4 MG TbDL  "Take 1 tablet (4 mg total) by mouth every 6 (six) hours as needed (nausea)., Starting Thu 3/21/2024, Normal           CONTINUE these medications which have NOT CHANGED    Details   amLODIPine (NORVASC) 5 MG tablet Take 1 tablet by mouth once daily, Starting Mon 2/26/2024, Normal      calcium carbonate-vit D3-min 600 mg calcium- 400 unit Tab Take 1 tablet by mouth 2 (two) times a day., Starting Mon 4/17/2023, Until Thu 3/21/2024, Normal      tirzepatide (MOUNJARO) 5 mg/0.5 mL PnIj Inject 5 mg into the skin every 7 days., Starting Sun 3/10/2024, Until Sat 6/8/2024, Normal              Discussed case with:N/A      Portions of this note may have been created with voice recognition software. Occasional "wrong-word" or "sound-a-like" substitutions may have occurred due to the inherent limitations of voice recognition software. Please, read the note carefully and recognize, using context, where substitutions have occurred.          Clinical Impression       ICD-10-CM ICD-9-CM   1. Nausea and vomiting, unspecified vomiting type  R11.2 787.01   2. Weakness  R53.1 780.79        Disposition        Disposition: Discharge to home  Patient condition: Stable      ED Follow-up      Follow-up Information       [facility] In 2 days.    Specialty: Cardiology  Why: Return to emergency department for chest pain, chest pressure, indigestion, worsening fatigue, breaking out in sweats, nausea, vomiting, or other concerns  Contact information:  [address] [facility] [address] [phone]  Additional information:  Please park on the [address] side and use the Clinic entrance. Check in on the 3rd floor, to the right.             Gianfranco Amanda MD In 2 days.    Specialties: Internal Medicine, Family Medicine  Contact information:  [address] [address] [address]  [phone]                                      Destiny Dueñas,   03/21/24 1700    "

## 2024-03-21 NOTE — DISCHARGE INSTRUCTIONS
Take frequent sips of Pedialyte, Powerade, Gatorade.  Popsicles.  Deuel diet, bananas, breads, rice.  Avoid red sauces, fruit juices, and dairy products as can irritate your stomach.  If drinking water, be sure to have something salty such as crackers to help restore electrolytes.  Return to emergency department for: Weakness, passing out, blood in stool, blood in vomit, fever, worsening abdominal pain, or other concerns.

## 2024-03-22 LAB
HCV AB SERPL QL IA: NEGATIVE
HIV 1+2 AB+HIV1 P24 AG SERPL QL IA: NEGATIVE

## 2024-03-28 ENCOUNTER — OFFICE VISIT (OUTPATIENT)
Dept: CARDIOLOGY | Facility: CLINIC | Age: 64
End: 2024-03-28
Payer: MEDICARE

## 2024-03-28 ENCOUNTER — LAB VISIT (OUTPATIENT)
Dept: LAB | Facility: HOSPITAL | Age: 64
End: 2024-03-28
Attending: INTERNAL MEDICINE
Payer: MEDICARE

## 2024-03-28 VITALS
DIASTOLIC BLOOD PRESSURE: 76 MMHG | HEART RATE: 76 BPM | OXYGEN SATURATION: 98 % | BODY MASS INDEX: 31.53 KG/M2 | HEIGHT: 63 IN | SYSTOLIC BLOOD PRESSURE: 134 MMHG | WEIGHT: 177.94 LBS

## 2024-03-28 DIAGNOSIS — R53.1 WEAKNESS: ICD-10-CM

## 2024-03-28 DIAGNOSIS — I49.3 PVC (PREMATURE VENTRICULAR CONTRACTION): ICD-10-CM

## 2024-03-28 DIAGNOSIS — R09.89 PULSE IRREGULARITY: Primary | ICD-10-CM

## 2024-03-28 DIAGNOSIS — R55 NEAR SYNCOPE: ICD-10-CM

## 2024-03-28 PROCEDURE — 84443 ASSAY THYROID STIM HORMONE: CPT | Performed by: INTERNAL MEDICINE

## 2024-03-28 PROCEDURE — 1159F MED LIST DOCD IN RCRD: CPT | Mod: CPTII,S$GLB,, | Performed by: INTERNAL MEDICINE

## 2024-03-28 PROCEDURE — 1126F AMNT PAIN NOTED NONE PRSNT: CPT | Mod: CPTII,S$GLB,, | Performed by: INTERNAL MEDICINE

## 2024-03-28 PROCEDURE — 3008F BODY MASS INDEX DOCD: CPT | Mod: CPTII,S$GLB,, | Performed by: INTERNAL MEDICINE

## 2024-03-28 PROCEDURE — 36415 COLL VENOUS BLD VENIPUNCTURE: CPT | Performed by: INTERNAL MEDICINE

## 2024-03-28 PROCEDURE — 1101F PT FALLS ASSESS-DOCD LE1/YR: CPT | Mod: CPTII,S$GLB,, | Performed by: INTERNAL MEDICINE

## 2024-03-28 PROCEDURE — 99204 OFFICE O/P NEW MOD 45 MIN: CPT | Mod: S$GLB,,, | Performed by: INTERNAL MEDICINE

## 2024-03-28 PROCEDURE — 3075F SYST BP GE 130 - 139MM HG: CPT | Mod: CPTII,S$GLB,, | Performed by: INTERNAL MEDICINE

## 2024-03-28 PROCEDURE — 93010 ELECTROCARDIOGRAM REPORT: CPT | Mod: S$GLB,,, | Performed by: STUDENT IN AN ORGANIZED HEALTH CARE EDUCATION/TRAINING PROGRAM

## 2024-03-28 PROCEDURE — 93005 ELECTROCARDIOGRAM TRACING: CPT

## 2024-03-28 PROCEDURE — 99999 PR PBB SHADOW E&M-EST. PATIENT-LVL III: CPT | Mod: PBBFAC,,, | Performed by: INTERNAL MEDICINE

## 2024-03-28 PROCEDURE — 3288F FALL RISK ASSESSMENT DOCD: CPT | Mod: CPTII,S$GLB,, | Performed by: INTERNAL MEDICINE

## 2024-03-28 PROCEDURE — 3078F DIAST BP <80 MM HG: CPT | Mod: CPTII,S$GLB,, | Performed by: INTERNAL MEDICINE

## 2024-03-28 NOTE — PROGRESS NOTES
Subjective:   Patient ID:  Julio Browne is a 64 y.o. female who presents for evaluation of No chief complaint on file.      HPI  64-year-old female.  Comes in for evaluation of near-syncope.    She is never smoker.  Seldom alcohol.    Denies any chest pain, palpitations, syncope.    She states that she has had about 3 episodes so far it started about six-month ago.  She was initially on Rybelsus than Ozempic then Mounjaro.    Appears all these medications have helped cover appetite 2.    She states that about six-month ago was her 1st episode.  She was sitting on her desk when she all of the sudden felt hot clammy and had near-syncope however did not lose consciousness.    This happened again about a week ago also during a zoom meeting.  Were she had the same episodes.  She went to the emergency room.  Her troponins were normal twice.  Her blood work was negative.    Her TSH was not checked.    She had an isolated PVC on her exam at that day.    Today she has multiple PVCs on her EKG.    Denies excessive caffeine.      No past medical history on file.    Past Surgical History:   Procedure Laterality Date    BREAST BIOPSY      EYE SURGERY         Social History     Tobacco Use    Smoking status: Never    Smokeless tobacco: Never   Substance Use Topics    Alcohol use: Yes     Comment: seldom    Drug use: Never       Family History   Problem Relation Age of Onset    Breast cancer Maternal Grandmother        Review of Systems   Cardiovascular:  Positive for near-syncope. Negative for chest pain, dyspnea on exertion, palpitations and syncope.   Genitourinary: Negative.    Neurological: Negative.        Current Outpatient Medications on File Prior to Visit   Medication Sig    amLODIPine (NORVASC) 5 MG tablet Take 1 tablet by mouth once daily    calcium carbonate-vit D3-min 600 mg calcium- 400 unit Tab Take 1 tablet by mouth 2 (two) times a day.    ondansetron (ZOFRAN-ODT) 4 MG TbDL Take 1 tablet (4 mg total) by mouth  every 6 (six) hours as needed (nausea).    tirzepatide (MOUNJARO) 5 mg/0.5 mL PnIj Inject 5 mg into the skin every 7 days.     No current facility-administered medications on file prior to visit.       Objective:   Objective:  Wt Readings from Last 3 Encounters:   03/28/24 80.7 kg (177 lb 14.6 oz)   03/21/24 80.1 kg (176 lb 9.4 oz)   01/16/24 84.6 kg (186 lb 8.2 oz)     Temp Readings from Last 3 Encounters:   03/21/24 97.8 °F (36.6 °C) (Oral)   01/16/24 97.2 °F (36.2 °C)   01/02/24 97.3 °F (36.3 °C) (Tympanic)     BP Readings from Last 3 Encounters:   03/28/24 134/76   03/21/24 (!) 157/78   01/16/24 130/72     Pulse Readings from Last 3 Encounters:   03/28/24 76   03/21/24 88   01/16/24 88       Physical Exam  Vitals reviewed.   Constitutional:       Appearance: She is well-developed.   Neck:      Vascular: No carotid bruit.   Cardiovascular:      Rate and Rhythm: Normal rate and regular rhythm.      Pulses: Intact distal pulses.      Heart sounds: Normal heart sounds. No murmur heard.  Pulmonary:      Breath sounds: Normal breath sounds.   Neurological:      Mental Status: She is oriented to person, place, and time.         Lab Results   Component Value Date    CHOL 198 04/10/2023     Lab Results   Component Value Date    HDL 71 04/10/2023     Lab Results   Component Value Date    LDLCALC 115.8 04/10/2023     Lab Results   Component Value Date    TRIG 56 04/10/2023     Lab Results   Component Value Date    CHOLHDL 35.9 04/10/2023       Chemistry        Component Value Date/Time     03/21/2024 1232    K 3.5 03/21/2024 1232     03/21/2024 1232    CO2 22 (L) 03/21/2024 1232    BUN 12 03/21/2024 1232    CREATININE 0.8 03/21/2024 1232     03/21/2024 1232        Component Value Date/Time    CALCIUM 9.3 03/21/2024 1232    ALKPHOS 56 03/21/2024 1232    AST 21 03/21/2024 1232    ALT 20 03/21/2024 1232    BILITOT 0.6 03/21/2024 1232    ESTGFRAFRICA >60.0 03/30/2021 0527    EGFRNONAA >60.0 03/30/2021 0527  "         No results found for: "TSH"  No results found for: "INR", "PROTIME"  Lab Results   Component Value Date    WBC 8.60 03/21/2024    HGB 14.1 03/21/2024    HCT 40.9 03/21/2024    MCV 87 03/21/2024     03/21/2024     BNP  @LABRCNTIP(BNP,BNPTRIAGEBLO)@  CrCl cannot be calculated (Patient's most recent lab result is older than the maximum 7 days allowed.).     Imaging:  ======    No results found for this or any previous visit.    No results found for this or any previous visit.    No results found for this or any previous visit.    No results found for this or any previous visit.    No valid procedures specified.    No results found for this or any previous visit.      No results found for this or any previous visit.      No results found for this or any previous visit.      Diagnostic Results:  ECG: Reviewed    The 10-year ASCVD risk score (Khoa YANG, et al., 2019) is: 8.2%    Values used to calculate the score:      Age: 64 years      Sex: Female      Is Non- : No      Diabetic: No      Tobacco smoker: No      Systolic Blood Pressure: 134 mmHg      Is BP treated: Yes      HDL Cholesterol: 71 mg/dL      Total Cholesterol: 198 mg/dL        Assessment and Plan:   Pulse irregularity  -     IN OFFICE EKG 12-LEAD (to Muse)    Weakness  -     Ambulatory referral/consult to Cardiology  -     TSH; Future; Expected date: 03/28/2024    Near syncope  -     Exercise Stress - EKG; Future  -     Cardiac Monitor - 3-15 Day Adult (Cupid Only); Future    PVC (premature ventricular contraction)  -     TSH; Future; Expected date: 03/28/2024      Decrease caffeine.    Vasovagal episodes.  Can not exclude possible relation to her Mounjaro.  As her symptoms have relationship in time with the onset of her symptoms.  Proper hydration.  Lower body exercise.  Compression stockings.  Reviewed all tests and above medical conditions with patient in detail and formulated treatment plan.  Risk factor " modification discussed.   Cardiac low salt diet discussed.  Maintaining healthy weight and weight loss goals were discussed in clinic.  Normal sinus with PVCs.  Follow up in  6 months

## 2024-03-29 LAB — TSH SERPL DL<=0.005 MIU/L-ACNC: 1.2 UIU/ML (ref 0.4–4)

## 2024-04-03 LAB
OHS QRS DURATION: 72 MS
OHS QTC CALCULATION: 435 MS

## 2024-04-16 ENCOUNTER — HOSPITAL ENCOUNTER (OUTPATIENT)
Dept: CARDIOLOGY | Facility: HOSPITAL | Age: 64
Discharge: HOME | End: 2024-04-16
Attending: INTERNAL MEDICINE
Payer: MEDICARE

## 2024-04-16 DIAGNOSIS — R55 NEAR SYNCOPE: ICD-10-CM

## 2024-04-16 LAB
CV STRESS BASE HR: 82 BPM
DIASTOLIC BLOOD PRESSURE: 75 MMHG
OHS CV CPX 85 PERCENT MAX PREDICTED HEART RATE MALE: 131
OHS CV CPX MAX PREDICTED HEART RATE: 154
OHS CV CPX PATIENT IS FEMALE: 1
OHS CV CPX PATIENT IS MALE: 0
OHS CV CPX PEAK DIASTOLIC BLOOD PRESSURE: 73 MMHG
OHS CV CPX PEAK HEAR RATE: 144 BPM
OHS CV CPX PEAK RATE PRESSURE PRODUCT: NORMAL
OHS CV CPX PEAK SYSTOLIC BLOOD PRESSURE: 158 MMHG
OHS CV CPX PERCENT MAX PREDICTED HEART RATE ACHIEVED: 97
OHS CV CPX RATE PRESSURE PRODUCT PRESENTING: NORMAL
STRESS ECHO POST EXERCISE DUR MIN: 6 MINUTES
STRESS ECHO POST EXERCISE DUR SEC: 20 SECONDS
STRESS ST DEPRESSION: 2 MM
SYSTOLIC BLOOD PRESSURE: 128 MMHG

## 2024-04-16 PROCEDURE — 93016 CV STRESS TEST SUPVJ ONLY: CPT | Mod: ,,, | Performed by: INTERNAL MEDICINE

## 2024-04-16 PROCEDURE — 93248 EXT ECG>7D<15D REV&INTERPJ: CPT | Mod: ,,, | Performed by: INTERNAL MEDICINE

## 2024-04-16 PROCEDURE — 93018 CV STRESS TEST I&R ONLY: CPT | Mod: ,,, | Performed by: INTERNAL MEDICINE

## 2024-04-16 PROCEDURE — 93017 CV STRESS TEST TRACING ONLY: CPT

## 2024-04-18 ENCOUNTER — OFFICE VISIT (OUTPATIENT)
Dept: INTERNAL MEDICINE | Facility: CLINIC | Age: 64
End: 2024-04-18
Payer: MEDICARE

## 2024-04-18 ENCOUNTER — LAB VISIT (OUTPATIENT)
Dept: LAB | Facility: HOSPITAL | Age: 64
End: 2024-04-18
Attending: STUDENT IN AN ORGANIZED HEALTH CARE EDUCATION/TRAINING PROGRAM
Payer: MEDICARE

## 2024-04-18 ENCOUNTER — TELEPHONE (OUTPATIENT)
Dept: CARDIOLOGY | Facility: CLINIC | Age: 64
End: 2024-04-18
Payer: MEDICARE

## 2024-04-18 VITALS
SYSTOLIC BLOOD PRESSURE: 130 MMHG | DIASTOLIC BLOOD PRESSURE: 72 MMHG | OXYGEN SATURATION: 96 % | BODY MASS INDEX: 32.07 KG/M2 | HEART RATE: 86 BPM | TEMPERATURE: 98 F | HEIGHT: 63 IN | WEIGHT: 181 LBS

## 2024-04-18 DIAGNOSIS — I10 PRIMARY HYPERTENSION: Primary | ICD-10-CM

## 2024-04-18 DIAGNOSIS — R73.9 HYPERGLYCEMIA: ICD-10-CM

## 2024-04-18 DIAGNOSIS — E66.09 CLASS 1 OBESITY DUE TO EXCESS CALORIES WITHOUT SERIOUS COMORBIDITY WITH BODY MASS INDEX (BMI) OF 31.0 TO 31.9 IN ADULT: ICD-10-CM

## 2024-04-18 DIAGNOSIS — R94.39 ABNORMAL STRESS ECG WITH TREADMILL: Primary | ICD-10-CM

## 2024-04-18 DIAGNOSIS — I10 PRIMARY HYPERTENSION: ICD-10-CM

## 2024-04-18 LAB
ALBUMIN SERPL BCP-MCNC: 3.8 G/DL (ref 3.5–5.2)
ALP SERPL-CCNC: 63 U/L (ref 55–135)
ALT SERPL W/O P-5'-P-CCNC: 23 U/L (ref 10–44)
ANION GAP SERPL CALC-SCNC: 11 MMOL/L (ref 8–16)
AST SERPL-CCNC: 20 U/L (ref 10–40)
BILIRUB SERPL-MCNC: 0.4 MG/DL (ref 0.1–1)
BUN SERPL-MCNC: 10 MG/DL (ref 8–23)
CALCIUM SERPL-MCNC: 9.4 MG/DL (ref 8.7–10.5)
CHLORIDE SERPL-SCNC: 106 MMOL/L (ref 95–110)
CHOLEST SERPL-MCNC: 220 MG/DL (ref 120–199)
CHOLEST/HDLC SERPL: 3.3 {RATIO} (ref 2–5)
CO2 SERPL-SCNC: 26 MMOL/L (ref 23–29)
CREAT SERPL-MCNC: 0.8 MG/DL (ref 0.5–1.4)
EST. GFR  (NO RACE VARIABLE): >60 ML/MIN/1.73 M^2
ESTIMATED AVG GLUCOSE: 100 MG/DL (ref 68–131)
GLUCOSE SERPL-MCNC: 98 MG/DL (ref 70–110)
HBA1C MFR BLD: 5.1 % (ref 4–5.6)
HDLC SERPL-MCNC: 67 MG/DL (ref 40–75)
HDLC SERPL: 30.5 % (ref 20–50)
LDLC SERPL CALC-MCNC: 134.8 MG/DL (ref 63–159)
NONHDLC SERPL-MCNC: 153 MG/DL
POTASSIUM SERPL-SCNC: 4.3 MMOL/L (ref 3.5–5.1)
PROT SERPL-MCNC: 7.1 G/DL (ref 6–8.4)
SODIUM SERPL-SCNC: 143 MMOL/L (ref 136–145)
TRIGL SERPL-MCNC: 91 MG/DL (ref 30–150)

## 2024-04-18 PROCEDURE — 83036 HEMOGLOBIN GLYCOSYLATED A1C: CPT | Performed by: STUDENT IN AN ORGANIZED HEALTH CARE EDUCATION/TRAINING PROGRAM

## 2024-04-18 PROCEDURE — 3288F FALL RISK ASSESSMENT DOCD: CPT | Mod: CPTII,S$GLB,, | Performed by: STUDENT IN AN ORGANIZED HEALTH CARE EDUCATION/TRAINING PROGRAM

## 2024-04-18 PROCEDURE — 3008F BODY MASS INDEX DOCD: CPT | Mod: CPTII,S$GLB,, | Performed by: STUDENT IN AN ORGANIZED HEALTH CARE EDUCATION/TRAINING PROGRAM

## 2024-04-18 PROCEDURE — 3078F DIAST BP <80 MM HG: CPT | Mod: CPTII,S$GLB,, | Performed by: STUDENT IN AN ORGANIZED HEALTH CARE EDUCATION/TRAINING PROGRAM

## 2024-04-18 PROCEDURE — 80053 COMPREHEN METABOLIC PANEL: CPT | Mod: PO | Performed by: STUDENT IN AN ORGANIZED HEALTH CARE EDUCATION/TRAINING PROGRAM

## 2024-04-18 PROCEDURE — 36415 COLL VENOUS BLD VENIPUNCTURE: CPT | Mod: PO | Performed by: STUDENT IN AN ORGANIZED HEALTH CARE EDUCATION/TRAINING PROGRAM

## 2024-04-18 PROCEDURE — G2211 COMPLEX E/M VISIT ADD ON: HCPCS | Mod: S$GLB,,, | Performed by: STUDENT IN AN ORGANIZED HEALTH CARE EDUCATION/TRAINING PROGRAM

## 2024-04-18 PROCEDURE — 1126F AMNT PAIN NOTED NONE PRSNT: CPT | Mod: CPTII,S$GLB,, | Performed by: STUDENT IN AN ORGANIZED HEALTH CARE EDUCATION/TRAINING PROGRAM

## 2024-04-18 PROCEDURE — 1159F MED LIST DOCD IN RCRD: CPT | Mod: CPTII,S$GLB,, | Performed by: STUDENT IN AN ORGANIZED HEALTH CARE EDUCATION/TRAINING PROGRAM

## 2024-04-18 PROCEDURE — 1101F PT FALLS ASSESS-DOCD LE1/YR: CPT | Mod: CPTII,S$GLB,, | Performed by: STUDENT IN AN ORGANIZED HEALTH CARE EDUCATION/TRAINING PROGRAM

## 2024-04-18 PROCEDURE — 80061 LIPID PANEL: CPT | Performed by: STUDENT IN AN ORGANIZED HEALTH CARE EDUCATION/TRAINING PROGRAM

## 2024-04-18 PROCEDURE — 1160F RVW MEDS BY RX/DR IN RCRD: CPT | Mod: CPTII,S$GLB,, | Performed by: STUDENT IN AN ORGANIZED HEALTH CARE EDUCATION/TRAINING PROGRAM

## 2024-04-18 PROCEDURE — 3075F SYST BP GE 130 - 139MM HG: CPT | Mod: CPTII,S$GLB,, | Performed by: STUDENT IN AN ORGANIZED HEALTH CARE EDUCATION/TRAINING PROGRAM

## 2024-04-18 PROCEDURE — 99999 PR PBB SHADOW E&M-EST. PATIENT-LVL III: CPT | Mod: PBBFAC,,, | Performed by: STUDENT IN AN ORGANIZED HEALTH CARE EDUCATION/TRAINING PROGRAM

## 2024-04-18 PROCEDURE — 99214 OFFICE O/P EST MOD 30 MIN: CPT | Mod: S$GLB,,, | Performed by: STUDENT IN AN ORGANIZED HEALTH CARE EDUCATION/TRAINING PROGRAM

## 2024-04-18 NOTE — PROGRESS NOTES
Chief Complaint   Patient presents with    Health Maintenance     HPI: Julio Browne is a 64 y.o. female  with Pmhx listed below who presents to clinic for routine follow up. She reports to be doing well. Denies having any shortness of breath, chest pain, abdominal pain, palpitation, leg swelling, syncopal episode, nausea, vomiting, fever and other complains at present. Medication list has been reviewed and updated along with her. She reports to be compliant with her medication and has no issues taking it.  She is currently being followed by cardiology for evaluation of vasovagal syncope. She recently stress test which was negative . Her routine lab work was normal as well. She is currently on loop recorder for PVC's shown on her last EKG. She is off of mounjaro since her last syncopal episode.         Problem List:  Patient Active Problem List   Diagnosis    Primary hypertension    Class 1 obesity due to excess calories with body mass index (BMI) of 32.0 to 32.9 in adult       ROS: Negative except as noted above.       Current Meds:  Current Outpatient Medications   Medication Sig Dispense Refill    amLODIPine (NORVASC) 5 MG tablet Take 1 tablet by mouth once daily 90 tablet 0    calcium carbonate-vit D3-min 600 mg calcium- 400 unit Tab Take 1 tablet by mouth 2 (two) times a day. 180 tablet 0    ondansetron (ZOFRAN-ODT) 4 MG TbDL Take 1 tablet (4 mg total) by mouth every 6 (six) hours as needed (nausea). 12 tablet 0    tirzepatide (MOUNJARO) 5 mg/0.5 mL PnIj Inject 5 mg into the skin every 7 days. (Patient not taking: Reported on 4/18/2024) 12 Pen 0     No current facility-administered medications for this visit.      PE:  BP: 130/72  Pulse: 86     Temp: 97.6 °F (36.4 °C)  Weight: 82.1 kg (181 lb) Body mass index is 32.06 kg/m².    Wt Readings from Last 5 Encounters:   04/18/24 82.1 kg (181 lb)   03/28/24 80.7 kg (177 lb 14.6 oz)   03/21/24 80.1 kg (176 lb 9.4 oz)   01/16/24 84.6 kg (186 lb 8.2 oz)   01/02/24  83.4 kg (183 lb 13.8 oz)     General appearance: alert and cooperative, not in acute distress  Head: normocephalic, without obvious abnormality, atraumatic  Eyes: conjunctivae/corneas clear. PERRL, EOM's intact.  Ears: clear tympanic membranes   Neck: no adenopathy, supple, symmetrical, trachea midline and thyroid not enlarged, symmetric, no tenderness/mass/nodules, no JVD  Throat: lips, mucosa, and tongue normal; teeth and gums normal; no thrush  Chest: no reproducible chest pain   Heart: regular rate and rhythm, S1, S2 normal, no murmur, click, rub or gallop  Lungs: unlabored respiration, bilateral equal air entry, normal vesicular breath sound heard, no wheezing, rhonchi   Abdomen: soft, non-tender, non-distended; bowel sounds +; no masses,  no organomegaly, no ascites   Extremities: normal, atraumatic, no cyanosis or edema noted B/L upper and lower extremities.  Skin: skin color, texture, turgor normal. No rashes or lesions noted.  Neurologic: grossly intact      Lab:  Lab Results   Component Value Date    WBC 8.60 03/21/2024    HGB 14.1 03/21/2024    HCT 40.9 03/21/2024    MCV 87 03/21/2024     03/21/2024     03/21/2024    K 3.5 03/21/2024     03/21/2024    CO2 22 (L) 03/21/2024    BUN 12 03/21/2024     03/21/2024    CALCIUM 9.3 03/21/2024    MG 1.7 03/21/2024    AST 21 03/21/2024    ALT 20 03/21/2024    CHOL 198 04/10/2023    HDL 71 04/10/2023    LDLCALC 115.8 04/10/2023    TRIG 56 04/10/2023    TSH 1.203 03/28/2024       Impression:    ICD-10-CM ICD-9-CM    1. Primary hypertension  I10 401.9 COMPREHENSIVE METABOLIC PANEL      LIPID PANEL      2. Hyperglycemia  R73.9 790.29 HEMOGLOBIN A1C        1. Primary hypertension  Low salt diet.  Enouraged to increase in physical activity at least 30 minutes of brisk walking/day , 5 days a week  Advised weight loss    Advised for DASH diet - The Dietary Approaches to Stop Hypertension (DASH) is high in vegetables, fruits, low-fat dairy products,  whole grains, poultry, fish, and nuts and low in sweets, sugar-sweetened beverages, and red meats   Stop smoking.  Limit caffeine intake  Limit alcohol intake <3/day for men and <2/day for women.  Advised to be compliant with medication.  Please monitor your blood pressure regularly at home   Return precaution provided  Normotensive on norvasc to be contineud  - COMPREHENSIVE METABOLIC PANEL; Future  - LIPID PANEL; Future    2. Hyperglycemia  - HEMOGLOBIN A1C; Future    3. Class 1 obesity due to excess calories without serious comorbidity with body mass index (BMI) of 31.0 to 31.9 in adult  Encourage exercise and dietary modification to loose weight.    Future Appointments   Date Time Provider Department Center   4/18/2024  8:10 AM [facility] [place]   10/21/2024  7:00 AM [facility] [place]         Visit today included increased complexity associated with the care of the episodic problem   addressed and managing the longitudinal care of the patient due to the serious and/or complex managed problem(s) .     Rtc in 6 months  Gianfranco Amanda MD

## 2024-04-18 NOTE — TELEPHONE ENCOUNTER
Contacted patient; Patient received and understood results with no questions or concerns.       Patient scheduled for tomorrow at [facility] for Stress Echo.        ----- Message from Tameka Poe MD sent at 4/18/2024 10:54 AM CDT -----  Please schedule for a stress echo.  Her EKGs stress test was not conclusive, however overall it appeared to me not to be majorly abnormal..    Dr. Poe  ----- Message -----  From: Oli Shukla MD  Sent: 4/16/2024   8:54 AM CDT  To: Tameka Poe MD

## 2024-04-19 ENCOUNTER — HOSPITAL ENCOUNTER (OUTPATIENT)
Dept: CARDIOLOGY | Facility: HOSPITAL | Age: 64
Discharge: HOME | End: 2024-04-19
Attending: INTERNAL MEDICINE
Payer: MEDICARE

## 2024-04-19 VITALS
HEART RATE: 74 BPM | BODY MASS INDEX: 32.07 KG/M2 | HEIGHT: 63 IN | DIASTOLIC BLOOD PRESSURE: 86 MMHG | WEIGHT: 181 LBS | SYSTOLIC BLOOD PRESSURE: 141 MMHG

## 2024-04-19 DIAGNOSIS — R94.39 ABNORMAL STRESS ECG WITH TREADMILL: ICD-10-CM

## 2024-04-19 LAB
AORTIC ROOT ANNULUS: 2.7 CM
ASCENDING AORTA: 2.12 CM
AV INDEX (PROSTH): 0.66
AV MEAN GRADIENT: 5 MMHG
AV PEAK GRADIENT: 10 MMHG
AV VALVE AREA BY VELOCITY RATIO: 1.78 CM²
AV VALVE AREA: 1.9 CM²
AV VELOCITY RATIO: 0.62
BSA FOR ECHO PROCEDURE: 1.91 M2
CV ECHO LV RWT: 0.36 CM
CV STRESS BASE HR: 74 BPM
DIASTOLIC BLOOD PRESSURE: 86 MMHG
DOP CALC AO PEAK VEL: 1.61 M/S
DOP CALC AO VTI: 36.7 CM
DOP CALC LVOT AREA: 2.9 CM2
DOP CALC LVOT DIAMETER: 1.91 CM
DOP CALC LVOT PEAK VEL: 1 M/S
DOP CALC LVOT STROKE VOLUME: 69.88 CM3
DOP CALC RVOT PEAK VEL: 0.77 M/S
DOP CALC RVOT VTI: 21.8 CM
DOP CALCLVOT PEAK VEL VTI: 24.4 CM
E WAVE DECELERATION TIME: 229.53 MSEC
E/A RATIO: 1.14
E/E' RATIO: 8.72 M/S
ECHO LV POSTERIOR WALL: 0.79 CM (ref 0.6–1.1)
FRACTIONAL SHORTENING: 29 % (ref 28–44)
INTERVENTRICULAR SEPTUM: 0.92 CM (ref 0.6–1.1)
IVRT: 97.05 MSEC
LA MAJOR: 5.09 CM
LA MINOR: 4.55 CM
LA WIDTH: 3.5 CM
LEFT ATRIUM SIZE: 2.92 CM
LEFT ATRIUM VOLUME INDEX MOD: 17.4 ML/M2
LEFT ATRIUM VOLUME INDEX: 22.6 ML/M2
LEFT ATRIUM VOLUME MOD: 32.17 CM3
LEFT ATRIUM VOLUME: 41.74 CM3
LEFT INTERNAL DIMENSION IN SYSTOLE: 3.08 CM (ref 2.1–4)
LEFT VENTRICLE DIASTOLIC VOLUME INDEX: 45.95 ML/M2
LEFT VENTRICLE DIASTOLIC VOLUME: 85 ML
LEFT VENTRICLE MASS INDEX: 63 G/M2
LEFT VENTRICLE SYSTOLIC VOLUME INDEX: 20.2 ML/M2
LEFT VENTRICLE SYSTOLIC VOLUME: 37.43 ML
LEFT VENTRICULAR INTERNAL DIMENSION IN DIASTOLE: 4.34 CM (ref 3.5–6)
LEFT VENTRICULAR MASS: 116.83 G
LV LATERAL E/E' RATIO: 6.81 M/S
LV SEPTAL E/E' RATIO: 12.11 M/S
LVOT MG: 2.22 MMHG
LVOT MV: 0.71 CM/S
MV PEAK A VEL: 0.96 M/S
MV PEAK E VEL: 1.09 M/S
MV STENOSIS PRESSURE HALF TIME: 66.56 MS
MV VALVE AREA P 1/2 METHOD: 3.31 CM2
OHS CV CPX 1 MINUTE RECOVERY HEART RATE: 115 BPM
OHS CV CPX 85 PERCENT MAX PREDICTED HEART RATE MALE: 131
OHS CV CPX ESTIMATED METS: 7
OHS CV CPX MAX PREDICTED HEART RATE: 154
OHS CV CPX PATIENT IS FEMALE: 1
OHS CV CPX PATIENT IS MALE: 0
OHS CV CPX PEAK DIASTOLIC BLOOD PRESSURE: 77 MMHG
OHS CV CPX PEAK HEAR RATE: 148 BPM
OHS CV CPX PEAK RATE PRESSURE PRODUCT: NORMAL
OHS CV CPX PEAK SYSTOLIC BLOOD PRESSURE: 186 MMHG
OHS CV CPX PERCENT MAX PREDICTED HEART RATE ACHIEVED: 100
OHS CV CPX RATE PRESSURE PRODUCT PRESENTING: NORMAL
OHS CV RV/LV RATIO: 0.62 CM
PISA TR MAX VEL: 2.74 M/S
PULM VEIN S/D RATIO: 1.65
PV MEAN GRADIENT: 2 MMHG
PV PEAK D VEL: 0.49 M/S
PV PEAK GRADIENT: 5 MMHG
PV PEAK S VEL: 0.81 M/S
PV PEAK VELOCITY: 1.17 M/S
RA MAJOR: 4.19 CM
RA PRESSURE ESTIMATED: 3 MMHG
RA WIDTH: 2.74 CM
RIGHT VENTRICULAR END-DIASTOLIC DIMENSION: 2.67 CM
RV TB RVSP: 6 MMHG
SINUS: 2.33 CM
STJ: 2.17 CM
STRESS ECHO POST EXERCISE DUR MIN: 6 MINUTES
STRESS ECHO POST EXERCISE DUR SEC: 0 SECONDS
STRESS ST DEPRESSION: 1 MM
SYSTOLIC BLOOD PRESSURE: 141 MMHG
TDI LATERAL: 0.16 M/S
TDI SEPTAL: 0.09 M/S
TDI: 0.13 M/S
TR MAX PG: 30 MMHG
TRICUSPID ANNULAR PLANE SYSTOLIC EXCURSION: 2.34 CM
TV REST PULMONARY ARTERY PRESSURE: 33 MMHG
Z-SCORE OF LEFT VENTRICULAR DIMENSION IN END DIASTOLE: -1.69
Z-SCORE OF LEFT VENTRICULAR DIMENSION IN END SYSTOLE: -0.23

## 2024-04-19 PROCEDURE — 93351 STRESS TTE COMPLETE: CPT

## 2024-04-19 PROCEDURE — 93351 STRESS TTE COMPLETE: CPT | Mod: 26,,, | Performed by: INTERNAL MEDICINE

## 2024-05-03 LAB
OHS CV HOLTER SINUS AVERAGE HR: 70
OHS CV HOLTER SINUS MAX HR: 140
OHS CV HOLTER SINUS MIN HR: 50

## 2024-05-24 DIAGNOSIS — I10 PRIMARY HYPERTENSION: ICD-10-CM

## 2024-05-24 NOTE — TELEPHONE ENCOUNTER
No care due was identified.  [facility] Embedded Care Due Messages. Reference number: [id number].   5/24/2024 9:25:18 AM CDT

## 2024-05-24 NOTE — TELEPHONE ENCOUNTER
Refill Routing Note   Medication(s) are not appropriate for processing by [facility] for the following reason(s):        Required vitals abnormal-  04/19/24 (!) 141/86       OR action(s):  Defer        Medication Therapy Plan: 04/19/24 (!) 141/86      Appointments  past 12m or future 3m with PCP    Date Provider   Last Visit   4/18/2024 Gianfranco Amanda MD   Next Visit   10/21/2024 Gianfranco Amanda MD   ED visits in past 90 days: 1        Note composed:12:19 PM 05/24/2024

## 2024-05-25 RX ORDER — AMLODIPINE BESYLATE 5 MG/1
5 TABLET ORAL DAILY
Qty: 90 TABLET | Refills: 0 | Status: SHIPPED | OUTPATIENT
Start: 2024-05-25 | End: 2024-08-23

## 2024-08-20 DIAGNOSIS — I10 PRIMARY HYPERTENSION: ICD-10-CM

## 2024-08-20 RX ORDER — AMLODIPINE BESYLATE 5 MG/1
5 TABLET ORAL
Qty: 90 TABLET | Refills: 2 | Status: SHIPPED | OUTPATIENT
Start: 2024-08-20

## 2024-08-20 NOTE — TELEPHONE ENCOUNTER
No care due was identified.  [facility] Embedded Care Due Messages. Reference number: [id number].   8/20/2024 9:19:04 AM CDT

## 2024-08-20 NOTE — TELEPHONE ENCOUNTER
Refill Routing Note   Medication(s) are not appropriate for processing by [facility] for the following reason(s):        Required vitals abnormal    [facility] action(s):  Defer             Appointments  past 12m or future 3m with PCP    Date Provider   Last Visit   4/18/2024 Gianfranco Amanda MD   Next Visit   10/21/2024 Gianfranco Amanda MD   ED visits in past 90 days: 0        Note composed:3:10 PM 08/20/2024

## 2024-08-21 ENCOUNTER — PATIENT MESSAGE (OUTPATIENT)
Dept: ADMINISTRATIVE | Facility: HOSPITAL | Age: 64
End: 2024-08-21
Payer: MEDICARE

## 2024-09-26 DIAGNOSIS — Z00.00 ENCOUNTER FOR MEDICARE ANNUAL WELLNESS EXAM: ICD-10-CM

## 2024-10-21 ENCOUNTER — OFFICE VISIT (OUTPATIENT)
Dept: INTERNAL MEDICINE | Facility: CLINIC | Age: 64
End: 2024-10-21
Payer: MEDICARE

## 2024-10-21 VITALS
HEART RATE: 75 BPM | HEIGHT: 63 IN | BODY MASS INDEX: 33.2 KG/M2 | DIASTOLIC BLOOD PRESSURE: 82 MMHG | OXYGEN SATURATION: 98 % | WEIGHT: 187.38 LBS | SYSTOLIC BLOOD PRESSURE: 154 MMHG | TEMPERATURE: 97 F

## 2024-10-21 DIAGNOSIS — Z12.31 ENCOUNTER FOR SCREENING MAMMOGRAM FOR BREAST CANCER: ICD-10-CM

## 2024-10-21 DIAGNOSIS — R73.9 HYPERGLYCEMIA: Primary | ICD-10-CM

## 2024-10-21 DIAGNOSIS — I10 PRIMARY HYPERTENSION: ICD-10-CM

## 2024-10-21 DIAGNOSIS — E66.09 CLASS 1 OBESITY DUE TO EXCESS CALORIES WITH BODY MASS INDEX (BMI) OF 32.0 TO 32.9 IN ADULT, UNSPECIFIED WHETHER SERIOUS COMORBIDITY PRESENT: ICD-10-CM

## 2024-10-21 DIAGNOSIS — E66.811 CLASS 1 OBESITY DUE TO EXCESS CALORIES WITH BODY MASS INDEX (BMI) OF 32.0 TO 32.9 IN ADULT, UNSPECIFIED WHETHER SERIOUS COMORBIDITY PRESENT: ICD-10-CM

## 2024-10-21 PROCEDURE — G2211 COMPLEX E/M VISIT ADD ON: HCPCS | Mod: S$GLB,,, | Performed by: STUDENT IN AN ORGANIZED HEALTH CARE EDUCATION/TRAINING PROGRAM

## 2024-10-21 PROCEDURE — 3079F DIAST BP 80-89 MM HG: CPT | Mod: CPTII,S$GLB,, | Performed by: STUDENT IN AN ORGANIZED HEALTH CARE EDUCATION/TRAINING PROGRAM

## 2024-10-21 PROCEDURE — 3077F SYST BP >= 140 MM HG: CPT | Mod: CPTII,S$GLB,, | Performed by: STUDENT IN AN ORGANIZED HEALTH CARE EDUCATION/TRAINING PROGRAM

## 2024-10-21 PROCEDURE — 99999 PR PBB SHADOW E&M-EST. PATIENT-LVL III: CPT | Mod: PBBFAC,,, | Performed by: STUDENT IN AN ORGANIZED HEALTH CARE EDUCATION/TRAINING PROGRAM

## 2024-10-21 PROCEDURE — 3288F FALL RISK ASSESSMENT DOCD: CPT | Mod: CPTII,S$GLB,, | Performed by: STUDENT IN AN ORGANIZED HEALTH CARE EDUCATION/TRAINING PROGRAM

## 2024-10-21 PROCEDURE — 3008F BODY MASS INDEX DOCD: CPT | Mod: CPTII,S$GLB,, | Performed by: STUDENT IN AN ORGANIZED HEALTH CARE EDUCATION/TRAINING PROGRAM

## 2024-10-21 PROCEDURE — 1160F RVW MEDS BY RX/DR IN RCRD: CPT | Mod: CPTII,S$GLB,, | Performed by: STUDENT IN AN ORGANIZED HEALTH CARE EDUCATION/TRAINING PROGRAM

## 2024-10-21 PROCEDURE — 1159F MED LIST DOCD IN RCRD: CPT | Mod: CPTII,S$GLB,, | Performed by: STUDENT IN AN ORGANIZED HEALTH CARE EDUCATION/TRAINING PROGRAM

## 2024-10-21 PROCEDURE — 3044F HG A1C LEVEL LT 7.0%: CPT | Mod: CPTII,S$GLB,, | Performed by: STUDENT IN AN ORGANIZED HEALTH CARE EDUCATION/TRAINING PROGRAM

## 2024-10-21 PROCEDURE — 99214 OFFICE O/P EST MOD 30 MIN: CPT | Mod: S$GLB,,, | Performed by: STUDENT IN AN ORGANIZED HEALTH CARE EDUCATION/TRAINING PROGRAM

## 2024-10-21 PROCEDURE — 1126F AMNT PAIN NOTED NONE PRSNT: CPT | Mod: CPTII,S$GLB,, | Performed by: STUDENT IN AN ORGANIZED HEALTH CARE EDUCATION/TRAINING PROGRAM

## 2024-10-21 PROCEDURE — 1101F PT FALLS ASSESS-DOCD LE1/YR: CPT | Mod: CPTII,S$GLB,, | Performed by: STUDENT IN AN ORGANIZED HEALTH CARE EDUCATION/TRAINING PROGRAM

## 2024-10-21 NOTE — PROGRESS NOTES
Chief Complaint   Patient presents with    Health Maintenance     HPI: Julio Browne is a 64 y.o. female  with Pmhx listed below who presents to clinic for    History of Present Illness    CHIEF COMPLAINT:  - Ms. Browne presents for a follow-up visit with elevated blood pressure noted during the encounter.    HPI:  Ms. Browne's blood pressure is elevated today with no apparent cause. She has not been monitoring her blood pressure at home. Ms. Browne is currently on one antihypertensive medication, which she takes at 3:30 AM before work. She confirms medication adherence this morning. She reports consuming coffee with creamer before the appointment, which may have influenced her blood pressure reading.    Ms. Browne denies chest pain, shortness of breath, constipation, headaches, fever, and swelling.        Today She reports to be doing well. Denies having any shortness of breath, chest pain, abdominal pain, palpitation, leg swelling, syncopal episode, nausea, vomiting, fever and other complains at present. Medication list has been reviewed and updated along with her. She reports to be compliant with her medication and has no issues taking it. No other questions and queries today.       Problem List:  Patient Active Problem List   Diagnosis    Primary hypertension    Class 1 obesity due to excess calories with body mass index (BMI) of 32.0 to 32.9 in adult       ROS: Negative except as noted above.       Current Meds:  Current Outpatient Medications   Medication Sig Dispense Refill    amLODIPine (NORVASC) 5 MG tablet Take 1 tablet by mouth once daily 90 tablet 2    calcium carbonate-vit D3-min 600 mg calcium- 400 unit Tab Take 1 tablet by mouth 2 (two) times a day. 180 tablet 0    ondansetron (ZOFRAN-ODT) 4 MG TbDL Take 1 tablet (4 mg total) by mouth every 6 (six) hours as needed (nausea). 12 tablet 0     No current facility-administered medications for this visit.      PE:  BP: (!) 154/82  Pulse: 75     Temp: 97.1 °F  (36.2 °C)  Weight: 85 kg (187 lb 6.3 oz) Body mass index is 33.19 kg/m².    Wt Readings from Last 5 Encounters:   10/21/24 85 kg (187 lb 6.3 oz)   04/19/24 82.1 kg (181 lb)   04/18/24 82.1 kg (181 lb)   03/28/24 80.7 kg (177 lb 14.6 oz)   03/21/24 80.1 kg (176 lb 9.4 oz)     General appearance: alert and cooperative, not in acute distress  Head: normocephalic, without obvious abnormality, atraumatic  Eyes: conjunctivae/corneas clear. PERRL, EOM's intact.  Ears: clear tympanic membranes   Neck: no adenopathy, supple, symmetrical, trachea midline and thyroid not enlarged, symmetric, no tenderness/mass/nodules, no JVD  Throat: lips, mucosa, and tongue normal; teeth and gums normal; no thrush  Chest: no reproducible chest pain   Heart: regular rate and rhythm, S1, S2 normal, no murmur, click, rub or gallop  Lungs: unlabored respiration, bilateral equal air entry, normal vesicular breath sound heard, no wheezing, rhonchi   Abdomen: soft, non-tender, non-distended; bowel sounds +; no masses,  no organomegaly, no ascites   Extremities: normal, atraumatic, no cyanosis or edema noted B/L upper and lower extremities.  Skin: skin color, texture, turgor normal. No rashes or lesions noted.  Neurologic: grossly intact      Lab:  Lab Results   Component Value Date    WBC 8.60 03/21/2024    HGB 14.1 03/21/2024    HCT 40.9 03/21/2024    MCV 87 03/21/2024     03/21/2024     04/18/2024    K 4.3 04/18/2024     04/18/2024    CO2 26 04/18/2024    BUN 10 04/18/2024    GLU 98 04/18/2024    CALCIUM 9.4 04/18/2024    MG 1.7 03/21/2024    AST 20 04/18/2024    ALT 23 04/18/2024    CHOL 220 (H) 04/18/2024    HDL 67 04/18/2024    LDLCALC 134.8 04/18/2024    TRIG 91 04/18/2024    TSH 1.203 03/28/2024       Impression:    ICD-10-CM ICD-9-CM    1. Hyperglycemia  R73.9 790.29 COMPREHENSIVE METABOLIC PANEL      HEMOGLOBIN A1C      2. Primary hypertension  I10 401.9       3. Class 1 obesity due to excess calories with body mass  index (BMI) of 32.0 to 32.9 in adult, unspecified whether serious comorbidity present  E66.811 278.00 LIPID PANEL    E66.09 V85.32     Z68.32        4. Encounter for screening mammogram for breast cancer  Z12.31 V76.12 Mammo Digital Screening Bilat w/ Ricky          Assessment & Plan    HYPERTENSION:  - Evaluated elevated blood pressure; patient unaware of home readings.  - Considered need for additional blood pressure medication based on in-office reading.  - Assessed current medication regimen and timing.  - Ms. Browne to monitor blood pressure at home.  - Ms. Browne to rest before taking blood pressure readings.  - Ms. Browne to record blood pressure readings on paper.  - Continued low DP (assumed to be lisinopril or similar ACE inhibitor) at current dose, taken at 3:30 AM.  - Repeat blood pressure measurement ordered in office.  - Follow up in 2 weeks with recorded blood pressure readings.    LABS:  - Reviewed need for fasting labs including cholesterol panel and chemistry profile.  - Fasting labs ordered including cholesterol panel and chemistry profile.  - Follow up tomorrow at 7:30 AM for fasting lab work.    PREVENTIVE CARE:  - Evaluated vaccination status and patient's preferences.  - Considered preventive care needs, including mammogram.  - Mammogram ordered.  - Follow up tomorrow after 1:30 PM for mammogram.    MEDICATIONS/SUPPLEMENTS:  - Continued calcium supplement.          1. Hyperglycemia (Primary)  - COMPREHENSIVE METABOLIC PANEL; Future  - HEMOGLOBIN A1C; Future    2. Primary hypertension  Low salt diet.  Enouraged to increase in physical activity at least 30 minutes of brisk walking/day , 5 days a week  Advised weight loss    Advised for DASH diet - The Dietary Approaches to Stop Hypertension (DASH) is high in vegetables, fruits, low-fat dairy products, whole grains, poultry, fish, and nuts and low in sweets, sugar-sweetened beverages, and red meats   Stop smoking.  Limit caffeine intake  Limit  alcohol intake <3/day for men and <2/day for women.  Advised to be compliant with medication.  Please monitor your blood pressure regularly at home   Return precaution provided  On Norvasc to be continued  Bp elevated at home  Not monitoring BP at home  Bp log to be maintained    3. Class 1 obesity due to excess calories with body mass index (BMI) of 32.0 to 32.9 in adult, unspecified whether serious comorbidity present  - LIPID PANEL; Future    4. Encounter for screening mammogram for breast cancer  - Mammo Digital Screening Bilat w/ Ricky; Future      Future Appointments   Date Time Provider Department Center   10/22/2024  8:20 AM [facility] [place]   10/22/2024  9:20 AM [facility] [place]   11/4/2024  7:20 AM Gianfranco Amanda MD [facility] [place]   Repeat lab  Mammogram to be done  BP log to be maintained  Rtc in 2 weeks with BP log    I spent a total of 30 minutes on the day of the visit.This includes face to face time and non-face to face time preparing to see the patient (eg, review of tests), obtaining and/or reviewing separately obtained history, documenting clinical information in the electronic or other health record, independently interpreting results and communicating results to the patient/family/caregiver, or care coordinator.  Visit today included increased complexity associated with the care of the episodic problem   addressed and managing the longitudinal care of the patient due to the serious and/or complex managed problem(s) .     Gianfranco Amanda MD    This note was generated with the assistance of ambient listening technology. Verbal consent was obtained by the patient and accompanying visitor(s) for the recording of patient appointment to facilitate this note. I attest to having reviewed and edited the generated note for accuracy, though some syntax or spelling errors may persist. Please contact the author of this note for any clarification.

## 2024-10-22 ENCOUNTER — HOSPITAL ENCOUNTER (OUTPATIENT)
Dept: RADIOLOGY | Facility: HOSPITAL | Age: 64
Discharge: HOME | End: 2024-10-22
Attending: STUDENT IN AN ORGANIZED HEALTH CARE EDUCATION/TRAINING PROGRAM
Payer: MEDICARE

## 2024-10-22 DIAGNOSIS — Z12.31 ENCOUNTER FOR SCREENING MAMMOGRAM FOR BREAST CANCER: ICD-10-CM

## 2024-10-22 PROCEDURE — 77063 BREAST TOMOSYNTHESIS BI: CPT | Mod: TC,PO

## 2024-10-22 PROCEDURE — 77063 BREAST TOMOSYNTHESIS BI: CPT | Mod: 26,,, | Performed by: STUDENT IN AN ORGANIZED HEALTH CARE EDUCATION/TRAINING PROGRAM

## 2024-10-22 PROCEDURE — 77067 SCR MAMMO BI INCL CAD: CPT | Mod: TC,PO

## 2024-10-22 PROCEDURE — 77067 SCR MAMMO BI INCL CAD: CPT | Mod: 26,,, | Performed by: STUDENT IN AN ORGANIZED HEALTH CARE EDUCATION/TRAINING PROGRAM

## 2024-10-23 DIAGNOSIS — E78.49 OTHER HYPERLIPIDEMIA: Primary | ICD-10-CM

## 2024-10-23 RX ORDER — ROSUVASTATIN CALCIUM 5 MG/1
5 TABLET, COATED ORAL DAILY
Qty: 90 TABLET | Refills: 3 | Status: SHIPPED | OUTPATIENT
Start: 2024-10-23 | End: 2025-10-23

## 2024-11-12 ENCOUNTER — PATIENT OUTREACH (OUTPATIENT)
Dept: ADMINISTRATIVE | Facility: HOSPITAL | Age: 64
End: 2024-11-12
Payer: MEDICARE

## 2024-11-12 NOTE — PROGRESS NOTES
VBHM Score: 1     Uncontrolled BP    Influenza Vaccine  Tetanus Vaccine  RSV Vaccine            Patient has PCP appt on 11.18.24, made note to address B/P at visit.

## 2024-11-14 ENCOUNTER — TELEPHONE (OUTPATIENT)
Dept: PHARMACY | Facility: CLINIC | Age: 64
End: 2024-11-14
Payer: MEDICARE

## 2024-11-14 NOTE — TELEPHONE ENCOUNTER
[facility]/Population Health Chart Review & Patient Outreach Details For Medication Adherence Project    Reason for Outreach Encounter: 3rd Party payor non-compliance report ([employer], [employer], [employer], etc)  2.  Patient Outreach Method: Reviewed patient chart   3.   Medication in question: Ozempic, Mounjaro     4.  Reviewed and or Updates Made To: Patient Chart  5. Outreach Outcomes and/or actions taken: Medication discontinued  Additional Notes:   Pt not taking due to cost and no DM dx     Medical Necessity Information: It is in your best interest to select a reason for this procedure from the list below. All of these items fulfill various CMS LCD requirements except the new and changing color options. Medical Necessity Clause: This procedure was medically necessary because the lesion that was treated was: Lab: 48 Lab Facility: 0 Detail Level: Detailed Was A Bandage Applied: Yes Size Of Lesion In Cm (Required): 1.1 Depth Of Shave: dermis Biopsy Method: Dermablade Anesthesia Type: 1% lidocaine with 1:200,000 epinephrine and a 1:10 solution of 8.4% sodium bicarbonate Hemostasis: Electrocautery Wound Care: No ointment Render Path Notes In Note?: No Consent was obtained from the patient. The risks and benefits to therapy were discussed in detail. Specifically, the risks of infection, scarring, bleeding, prolonged wound healing, incomplete removal, allergy to anesthesia, nerve injury and recurrence were addressed. Prior to the procedure, the treatment site was clearly identified and confirmed by the patient. All components of Universal Protocol/PAUSE Rule completed. Post-Care Instructions: I reviewed with the patient in detail post-care instructions. Patient is to keep the biopsy site dry overnight, and then apply bacitracin twice daily until healed. Patient may apply hydrogen peroxide soaks to remove any crusting. Notification Instructions: Patient will be notified of pathology results. However, patient instructed to call the office if not contacted within 2 weeks. Billing Type: Third-Party Bill

## 2024-11-18 ENCOUNTER — OFFICE VISIT (OUTPATIENT)
Dept: INTERNAL MEDICINE | Facility: CLINIC | Age: 64
End: 2024-11-18
Payer: MEDICARE

## 2024-11-18 VITALS
WEIGHT: 187.63 LBS | SYSTOLIC BLOOD PRESSURE: 130 MMHG | DIASTOLIC BLOOD PRESSURE: 78 MMHG | OXYGEN SATURATION: 97 % | HEART RATE: 77 BPM | BODY MASS INDEX: 33.25 KG/M2 | TEMPERATURE: 97 F | HEIGHT: 63 IN

## 2024-11-18 DIAGNOSIS — I10 PRIMARY HYPERTENSION: ICD-10-CM

## 2024-11-18 PROCEDURE — 3044F HG A1C LEVEL LT 7.0%: CPT | Mod: CPTII,S$GLB,, | Performed by: STUDENT IN AN ORGANIZED HEALTH CARE EDUCATION/TRAINING PROGRAM

## 2024-11-18 PROCEDURE — 1159F MED LIST DOCD IN RCRD: CPT | Mod: CPTII,S$GLB,, | Performed by: STUDENT IN AN ORGANIZED HEALTH CARE EDUCATION/TRAINING PROGRAM

## 2024-11-18 PROCEDURE — 99999 PR PBB SHADOW E&M-EST. PATIENT-LVL III: CPT | Mod: PBBFAC,,, | Performed by: STUDENT IN AN ORGANIZED HEALTH CARE EDUCATION/TRAINING PROGRAM

## 2024-11-18 PROCEDURE — 3075F SYST BP GE 130 - 139MM HG: CPT | Mod: CPTII,S$GLB,, | Performed by: STUDENT IN AN ORGANIZED HEALTH CARE EDUCATION/TRAINING PROGRAM

## 2024-11-18 PROCEDURE — 1101F PT FALLS ASSESS-DOCD LE1/YR: CPT | Mod: CPTII,S$GLB,, | Performed by: STUDENT IN AN ORGANIZED HEALTH CARE EDUCATION/TRAINING PROGRAM

## 2024-11-18 PROCEDURE — 3288F FALL RISK ASSESSMENT DOCD: CPT | Mod: CPTII,S$GLB,, | Performed by: STUDENT IN AN ORGANIZED HEALTH CARE EDUCATION/TRAINING PROGRAM

## 2024-11-18 PROCEDURE — 1126F AMNT PAIN NOTED NONE PRSNT: CPT | Mod: CPTII,S$GLB,, | Performed by: STUDENT IN AN ORGANIZED HEALTH CARE EDUCATION/TRAINING PROGRAM

## 2024-11-18 PROCEDURE — G2211 COMPLEX E/M VISIT ADD ON: HCPCS | Mod: S$GLB,,, | Performed by: STUDENT IN AN ORGANIZED HEALTH CARE EDUCATION/TRAINING PROGRAM

## 2024-11-18 PROCEDURE — 3078F DIAST BP <80 MM HG: CPT | Mod: CPTII,S$GLB,, | Performed by: STUDENT IN AN ORGANIZED HEALTH CARE EDUCATION/TRAINING PROGRAM

## 2024-11-18 PROCEDURE — 3008F BODY MASS INDEX DOCD: CPT | Mod: CPTII,S$GLB,, | Performed by: STUDENT IN AN ORGANIZED HEALTH CARE EDUCATION/TRAINING PROGRAM

## 2024-11-18 PROCEDURE — 99213 OFFICE O/P EST LOW 20 MIN: CPT | Mod: S$GLB,,, | Performed by: STUDENT IN AN ORGANIZED HEALTH CARE EDUCATION/TRAINING PROGRAM

## 2024-11-18 NOTE — PROGRESS NOTES
Chief Complaint   Patient presents with    Hypertension     HPI: Julio Browne is a 64 y.o. female  with Pmhx listed below who presents to clinic for HTN    History of Present Illness    CHIEF COMPLAINT:  - Ms. Browne presents for follow-up on blood pressure management and to discuss recent blood pressure readings.    HPI:  Ms. Browne has been monitoring blood pressure at home, with readings showing fluctuations over the past 30 days. She reports notably high readings, including one instance where blood pressure reached 161/156 and another day in November when it spiked to 160. These high readings typically resolve, with subsequent measurements showing lower values. Ms. Browne denies pain or stress on days with elevated blood pressure. She takes blood pressure readings after work, following a 2-hour relaxation period. Ms. Browne is currently taking amlodipine 5 mg in the mornings for blood pressure management. 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 Ms. Browne describes her current job as not stressful, mentioning that she left a previous stressful job. She reports not adding salt to her food, though she is attempting to persuade her  to reduce salt use while cooking.               Problem List:  Patient Active Problem List   Diagnosis    Primary hypertension    Class 1 obesity due to excess calories with body mass index (BMI) of 32.0 to 32.9 in adult       ROS: Negative except as noted above.       Current Meds:  Current Outpatient Medications   Medication Sig Dispense Refill    amLODIPine (NORVASC) 5 MG tablet Take 1 tablet by mouth once daily 90 tablet 2    calcium carbonate-vit D3-min 600 mg calcium- 400 unit Tab Take 1 tablet by mouth 2 (two) times a day. 180 tablet 0    rosuvastatin (CRESTOR) 5 MG tablet Take 1 tablet (5 mg total) by mouth once daily. 90 tablet 3     No current facility-administered medications for this visit.       PE:  BP: 130/78  Pulse: 77     Temp: 97 °F (36.1 °C)  Weight: 85.1 kg (187 lb 9.8 oz) Body mass index is 33.23 kg/m².    Wt Readings from Last 5 Encounters:   11/18/24 85.1 kg (187 lb 9.8 oz)   10/21/24 85 kg (187 lb 6.3 oz)   04/19/24 82.1 kg (181 lb)   04/18/24 82.1 kg (181 lb)   03/28/24 80.7 kg (177 lb 14.6 oz)     General appearance: alert and cooperative, not in acute distress  Head: normocephalic, without obvious abnormality, atraumatic  Eyes: conjunctivae/corneas clear. PERRL, EOM's intact.  Ears: clear tympanic membranes   Neck: no adenopathy, supple, symmetrical, trachea midline and thyroid not enlarged, symmetric, no tenderness/mass/nodules, no JVD  Throat: lips, mucosa, and tongue normal; teeth and gums normal; no thrush  Chest: no reproducible chest pain   Heart: regular rate and rhythm, S1, S2 normal, no murmur, click, rub or gallop  Lungs: unlabored respiration, bilateral equal air entry, normal vesicular breath sound heard, no wheezing, rhonchi   Abdomen: soft, non-tender, non-distended; bowel sounds +; no masses,  no organomegaly, no ascites   Extremities: normal, atraumatic, no cyanosis or edema noted B/L upper and lower extremities.  Skin: skin color, texture, turgor normal. No rashes or lesions noted.  Neurologic: grossly intact      Lab:  Lab Results   Component Value Date    WBC 8.60 03/21/2024    HGB 14.1 03/21/2024    HCT 40.9 03/21/2024    MCV 87 03/21/2024     03/21/2024     10/22/2024    K 4.8 10/22/2024     10/22/2024    CO2 26 10/22/2024    BUN 10 10/22/2024    GLU 97 10/22/2024    CALCIUM 9.7 10/22/2024    MG 1.7 03/21/2024    AST 17 10/22/2024    ALT 17 10/22/2024    CHOL 221 (H) 10/22/2024    HDL 72 10/22/2024    LDLCALC 127.4 10/22/2024    TRIG 108 10/22/2024    TSH 1.203 03/28/2024       Impression:    ICD-10-CM ICD-9-CM    1. Primary hypertension  I10 401.9           Assessment & Plan    HYPERTENSION:  Ms. Wilkinson blood pressure shows fluctuations, with  concerning spikes up to 160/156.  Current amlodipine 5mg daily regimen not providing consistent control.  Will adjust medication regimen to improve blood pressure control without adding new medications.  Changed amlodipine: Take 5mg tablet twice daily (morning and evening), instead of daily in the morning.  Ms. Browne to continue monitoring blood pressure at home.  Wait 2 hours after taking medication before measuring blood pressure to allow it to take effect.  Ms. Browne to continue efforts to reduce salt intake.  Recommend discussing with  about reducing salt while cooking.    LIFESTYLE MANAGEMENT:  Considered patient's reported low stress levels and adequate physical activity at work.  Ms. Browne to maintain current level of physical activity at work (10,000 to 12,000 steps per day).    FOLLOW-UP:  Follow up in 2 weeks to review blood pressure measurements.  Drop off blood pressure measurement records at the office before next appointment.  Will review records and contact patient with results.          1. Primary hypertension  Low salt diet.  Enouraged to increase in physical activity at least 30 minutes of brisk walking/day , 5 days a week  Advised weight loss    Advised for DASH diet - The Dietary Approaches to Stop Hypertension (DASH) is high in vegetables, fruits, low-fat dairy products, whole grains, poultry, fish, and nuts and low in sweets, sugar-sweetened beverages, and red meats   Stop smoking.  Limit caffeine intake  Limit alcohol intake <3/day for men and <2/day for women.  Advised to be compliant with medication.  Please monitor your blood pressure regularly at home   Return precaution provided  Blood pressure elevated mostly >130 -150 mm hg  Will increase amlodipine to BID.      Future Appointments   Date Time Provider Department Center   5/19/2025  7:40 AM Gianfranco Amanda MD [facility]  [place]   Rtc in 2 weeks for nurse only visit for HTN control    I spent a total of 28minutes on the day of  the visit.This includes face to face time and non-face to face time preparing to see the patient (eg, review of tests), obtaining and/or reviewing separately obtained history, documenting clinical information in the electronic or other health record, independently interpreting results and communicating results to the patient/family/caregiver, or care coordinator.  Visit today included increased complexity associated with the care of the episodic problem   addressed and managing the longitudinal care of the patient due to the serious and/or complex managed problem(s) .     Gianfranco Amanda MD    This note was generated with the assistance of ambient listening technology. Verbal consent was obtained by the patient and accompanying visitor(s) for the recording of patient appointment to facilitate this note. I attest to having reviewed and edited the generated note for accuracy, though some syntax or spelling errors may persist. Please contact the author of this note for any clarification.

## 2024-12-13 ENCOUNTER — HOSPITAL ENCOUNTER (EMERGENCY)
Facility: HOSPITAL | Age: 64
Discharge: HOME | End: 2024-12-13
Attending: EMERGENCY MEDICINE
Payer: MEDICARE

## 2024-12-13 VITALS
TEMPERATURE: 98 F | BODY MASS INDEX: 32.84 KG/M2 | OXYGEN SATURATION: 100 % | WEIGHT: 185.31 LBS | HEART RATE: 76 BPM | DIASTOLIC BLOOD PRESSURE: 64 MMHG | RESPIRATION RATE: 15 BRPM | SYSTOLIC BLOOD PRESSURE: 127 MMHG | HEIGHT: 63 IN

## 2024-12-13 DIAGNOSIS — R07.9 CHEST PAIN: Primary | ICD-10-CM

## 2024-12-13 LAB
ALBUMIN SERPL BCP-MCNC: 3.9 G/DL (ref 3.5–5.2)
ALP SERPL-CCNC: 63 U/L (ref 40–150)
ALT SERPL W/O P-5'-P-CCNC: 18 U/L (ref 10–44)
ANION GAP SERPL CALC-SCNC: 13 MMOL/L (ref 8–16)
AST SERPL-CCNC: 20 U/L (ref 10–40)
BASOPHILS # BLD AUTO: 0.05 K/UL (ref 0–0.2)
BASOPHILS NFR BLD: 0.4 % (ref 0–1.9)
BILIRUB SERPL-MCNC: 0.3 MG/DL (ref 0.1–1)
BNP SERPL-MCNC: 55 PG/ML (ref 0–99)
BUN SERPL-MCNC: 13 MG/DL (ref 8–23)
CALCIUM SERPL-MCNC: 9.7 MG/DL (ref 8.7–10.5)
CHLORIDE SERPL-SCNC: 104 MMOL/L (ref 95–110)
CO2 SERPL-SCNC: 24 MMOL/L (ref 23–29)
CREAT SERPL-MCNC: 0.8 MG/DL (ref 0.5–1.4)
DIFFERENTIAL METHOD BLD: ABNORMAL
EOSINOPHIL # BLD AUTO: 0.1 K/UL (ref 0–0.5)
EOSINOPHIL NFR BLD: 0.7 % (ref 0–8)
ERYTHROCYTE [DISTWIDTH] IN BLOOD BY AUTOMATED COUNT: 12.4 % (ref 11.5–14.5)
EST. GFR  (NO RACE VARIABLE): >60 ML/MIN/1.73 M^2
GLUCOSE SERPL-MCNC: 121 MG/DL (ref 70–110)
HCT VFR BLD AUTO: 39.6 % (ref 37–48.5)
HGB BLD-MCNC: 13.1 G/DL (ref 12–16)
IMM GRANULOCYTES # BLD AUTO: 0.04 K/UL (ref 0–0.04)
IMM GRANULOCYTES NFR BLD AUTO: 0.3 % (ref 0–0.5)
LYMPHOCYTES # BLD AUTO: 2 K/UL (ref 1–4.8)
LYMPHOCYTES NFR BLD: 15.7 % (ref 18–48)
MCH RBC QN AUTO: 30 PG (ref 27–31)
MCHC RBC AUTO-ENTMCNC: 33.1 G/DL (ref 32–36)
MCV RBC AUTO: 91 FL (ref 82–98)
MONOCYTES # BLD AUTO: 0.6 K/UL (ref 0.3–1)
MONOCYTES NFR BLD: 4.6 % (ref 4–15)
NEUTROPHILS # BLD AUTO: 9.8 K/UL (ref 1.8–7.7)
NEUTROPHILS NFR BLD: 78.3 % (ref 38–73)
NRBC BLD-RTO: 0 /100 WBC
OHS QRS DURATION: 82 MS
OHS QTC CALCULATION: 449 MS
PLATELET # BLD AUTO: 247 K/UL (ref 150–450)
PMV BLD AUTO: 10.7 FL (ref 9.2–12.9)
POTASSIUM SERPL-SCNC: 3.5 MMOL/L (ref 3.5–5.1)
PROT SERPL-MCNC: 7.4 G/DL (ref 6–8.4)
RBC # BLD AUTO: 4.36 M/UL (ref 4–5.4)
SODIUM SERPL-SCNC: 141 MMOL/L (ref 136–145)
TROPONIN I SERPL DL<=0.01 NG/ML-MCNC: 0.01 NG/ML (ref 0–0.03)
TROPONIN I SERPL DL<=0.01 NG/ML-MCNC: <0.006 NG/ML (ref 0–0.03)
WBC # BLD AUTO: 12.57 K/UL (ref 3.9–12.7)

## 2024-12-13 PROCEDURE — 85025 COMPLETE CBC W/AUTO DIFF WBC: CPT | Mod: ER | Performed by: EMERGENCY MEDICINE

## 2024-12-13 PROCEDURE — 25000003 PHARM REV CODE 250: Mod: ER | Performed by: EMERGENCY MEDICINE

## 2024-12-13 PROCEDURE — 84484 ASSAY OF TROPONIN QUANT: CPT | Mod: 91,ER | Performed by: EMERGENCY MEDICINE

## 2024-12-13 PROCEDURE — 99285 EMERGENCY DEPT VISIT HI MDM: CPT | Mod: 25,ER

## 2024-12-13 PROCEDURE — 80053 COMPREHEN METABOLIC PANEL: CPT | Mod: ER | Performed by: EMERGENCY MEDICINE

## 2024-12-13 PROCEDURE — 93005 ELECTROCARDIOGRAM TRACING: CPT | Mod: ER

## 2024-12-13 PROCEDURE — 93010 ELECTROCARDIOGRAM REPORT: CPT | Mod: ,,, | Performed by: INTERNAL MEDICINE

## 2024-12-13 PROCEDURE — 94761 N-INVAS EAR/PLS OXIMETRY MLT: CPT | Mod: ER

## 2024-12-13 PROCEDURE — 83880 ASSAY OF NATRIURETIC PEPTIDE: CPT | Mod: ER | Performed by: EMERGENCY MEDICINE

## 2024-12-13 RX ORDER — LIDOCAINE HYDROCHLORIDE 20 MG/ML
15 SOLUTION OROPHARYNGEAL ONCE
Status: COMPLETED | OUTPATIENT
Start: 2024-12-13 | End: 2024-12-13

## 2024-12-13 RX ORDER — ALUMINUM HYDROXIDE, MAGNESIUM HYDROXIDE, AND SIMETHICONE 1200; 120; 1200 MG/30ML; MG/30ML; MG/30ML
30 SUSPENSION ORAL ONCE
Status: COMPLETED | OUTPATIENT
Start: 2024-12-13 | End: 2024-12-13

## 2024-12-13 RX ORDER — METOPROLOL TARTRATE 25 MG/1
25 TABLET, FILM COATED ORAL
Status: COMPLETED | OUTPATIENT
Start: 2024-12-13 | End: 2024-12-13

## 2024-12-13 RX ADMIN — METOPROLOL TARTRATE 25 MG: 25 TABLET, FILM COATED ORAL at 02:12

## 2024-12-13 RX ADMIN — LIDOCAINE HYDROCHLORIDE 15 ML: 20 SOLUTION ORAL at 01:12

## 2024-12-13 RX ADMIN — ALUMINUM HYDROXIDE, MAGNESIUM HYDROXIDE, AND DIMETHICONE 30 ML: 200; 20; 200 SUSPENSION ORAL at 01:12

## 2024-12-13 NOTE — ED PROVIDER NOTES
Encounter Date: 12/13/2024       History     Chief Complaint   Patient presents with    Chest Pain     Pt ate bbq chips PTA and started having heartburn.      The history is provided by the patient.   Chest Pain  The current episode started several hours ago. Chest pain occurs constantly. The chest pain is unchanged. At its most intense, the chest pain is at 6/10. The chest pain is currently at 4/10. The quality of the pain is described as burning. The pain does not radiate. Pertinent negatives for primary symptoms include no fever, no fatigue, no syncope, no shortness of breath, no cough, no wheezing, no palpitations, no abdominal pain, no nausea and no vomiting.   Pertinent negatives for associated symptoms include no weakness.     Review of patient's allergies indicates:  No Known Allergies  Past Medical History:   Diagnosis Date    Hypertension      Past Surgical History:   Procedure Laterality Date    BREAST BIOPSY      EYE SURGERY       Family History   Problem Relation Name Age of Onset    Breast cancer Maternal Grandmother       Social History     Tobacco Use    Smoking status: Never    Smokeless tobacco: Never   Substance Use Topics    Alcohol use: Yes     Comment: seldom    Drug use: Never     Review of Systems   Constitutional:  Negative for fatigue and fever.   HENT:  Negative for sore throat.    Respiratory:  Negative for cough, shortness of breath and wheezing.    Cardiovascular:  Positive for chest pain. Negative for palpitations and syncope.   Gastrointestinal:  Negative for abdominal pain, nausea and vomiting.   Genitourinary:  Negative for dysuria.   Musculoskeletal:  Negative for back pain.   Skin:  Negative for rash.   Neurological:  Negative for weakness.   Hematological:  Does not bruise/bleed easily.       Physical Exam     Initial Vitals [12/13/24 0136]   BP Pulse Resp Temp SpO2   (!) 153/65 79 18 98.3 °F (36.8 °C) 97 %      MAP       --         Physical Exam    Nursing note and vitals  reviewed.  Constitutional: She appears well-developed and well-nourished. No distress.   HENT:   Head: Normocephalic and atraumatic. Mouth/Throat: Oropharynx is clear and moist.   Eyes: Conjunctivae and EOM are normal. Pupils are equal, round, and reactive to light.   Neck: Neck supple.   Normal range of motion.  Cardiovascular:  Normal rate, regular rhythm and normal heart sounds.           Pulmonary/Chest: Breath sounds normal. No respiratory distress.   Abdominal: Abdomen is soft. Bowel sounds are normal. She exhibits no distension. There is no abdominal tenderness.   Musculoskeletal:         General: Normal range of motion.      Cervical back: Normal range of motion and neck supple.     Neurological: She is alert and oriented to person, place, and time. She has normal strength.   Skin: Skin is warm and dry.   Psychiatric: She has a normal mood and affect. Thought content normal.         ED Course   Procedures  Labs Reviewed   CBC W/ AUTO DIFFERENTIAL - Abnormal       Result Value    WBC 12.57      RBC 4.36      Hemoglobin 13.1      Hematocrit 39.6      MCV 91      MCH 30.0      MCHC 33.1      RDW 12.4      Platelets 247      MPV 10.7      Immature Granulocytes 0.3      Gran # (ANC) 9.8 (*)     Immature Grans (Abs) 0.04      Lymph # 2.0      Mono # 0.6      Eos # 0.1      Baso # 0.05      nRBC 0      Gran % 78.3 (*)     Lymph % 15.7 (*)     Mono % 4.6      Eosinophil % 0.7      Basophil % 0.4      Differential Method Automated     COMPREHENSIVE METABOLIC PANEL - Abnormal    Sodium 141      Potassium 3.5      Chloride 104      CO2 24      Glucose 121 (*)     BUN 13      Creatinine 0.8      Calcium 9.7      Total Protein 7.4      Albumin 3.9      Total Bilirubin 0.3      Alkaline Phosphatase 63      AST 20      ALT 18      eGFR >60.0      Anion Gap 13     TROPONIN I    Troponin I 0.014     B-TYPE NATRIURETIC PEPTIDE    BNP 55     TROPONIN I    Troponin I <0.006       EKG Readings: (Independently Interpreted)    Rhythm: Normal Sinus Rhythm. Heart Rate: 84. Ectopy: Frequent PVCs. ST Segments: Normal ST Segments. T Waves: Normal. Clinical Impression: Normal Sinus Rhythm       Imaging Results              X-Ray Chest AP Portable (Preliminary result)  Result time 12/13/24 02:07:08      Wet Read by Sunil Shell MD (12/13/24 02:07:08, [facility] Emergency Dept, Emergency Medicine)    naf                                     Medications   aluminum-magnesium hydroxide-simethicone 200-200-20 mg/5 mL suspension 30 mL (30 mLs Oral Given 12/13/24 0152)     And   LIDOcaine viscous HCl 2% oral solution 15 mL (15 mLs Oral Given 12/13/24 0153)   metoprolol tartrate (LOPRESSOR) tablet 25 mg (25 mg Oral Given 12/13/24 0209)     Medical Decision Making  DDx ACS, Costochondritis, Pericarditis    Problems Addressed:  Chest pain: acute illness or injury    Amount and/or Complexity of Data Reviewed  External Data Reviewed: radiology.     Details: ·  Left Ventricle: The left ventricle is normal in size. Normal wall thickness. There is normal systolic function with a visually estimated ejection fraction of 55 - 70%. There is normal diastolic function.  ·  Right Ventricle: Normal right ventricular cavity size. Wall thickness is normal. Right ventricle wall motion  is normal. Systolic function is normal.  ·  Mitral Valve: There is mild regurgitation.  ·  Tricuspid Valve: There is mild regurgitation.  ·  Pulmonary Artery: The estimated pulmonary artery systolic pressure is 33 mmHg.  ·  IVC/SVC: Normal venous pressure at 3 mmHg.  ·  Stress Protocol: The patient exercised for 6 minutes 0 seconds on a Javi protocol, corresponding to a functional capacity of 7METS, achieving a peak heart rate of 148 bpm, which is 100% of the age predicted maximum heart rate. Their exercise capacity was normal. The patient reported no symptoms during the stress test. The test was stopped because the end of the protocol was reached.  ·  Baseline ECG: The  Baseline ECG reveals sinus rhythm. The axis is normal. The ST segments are normal.  ·  Stress ECG: There is 1.0 mm of upward-sloping ST segment depression noted during stress. There are no arrhythmias during stress. There is normal blood pressure response with stress.  ·  ECG Conclusion: The ECG portion of the study is negative for ischemia.  ·  Post-stress Impression: The study is negative with no echocardiographic evidence of stress induced ischem    Labs: ordered.  Radiology: ordered and independent interpretation performed.  ECG/medicine tests: ordered and independent interpretation performed. Decision-making details documented in ED Course.    Risk  OTC drugs.  Prescription drug management.  Decision regarding hospitalization.                                      Clinical Impression:  Final diagnoses:  [R07.9] Chest pain (Primary)          ED Disposition Condition    Discharge Stable          ED Prescriptions    None       Follow-up Information       Follow up With Specialties Details Why Contact Info    Gianfranco Amanda MD Internal Medicine, Family Medicine Schedule an appointment as soon as possible for a visit in 2 days  [address] [address] [address]  [phone]      [facility] - Emergency Dept Emergency Medicine  If symptoms worsen [address] [place] [address] 1  Emergency Department  [facility] [id number] [phone]    [facility] CARDIOLOGY Cardiology Schedule an appointment as soon as possible for a visit   [address] [facility] [address] [phone]             Sunil Shell MD  12/13/24 [id number]

## 2025-01-30 DIAGNOSIS — Z00.00 ENCOUNTER FOR MEDICARE ANNUAL WELLNESS EXAM: ICD-10-CM

## 2025-02-07 ENCOUNTER — TELEPHONE (OUTPATIENT)
Dept: PHARMACY | Facility: CLINIC | Age: 65
End: 2025-02-07
Payer: MEDICARE

## 2025-02-07 NOTE — TELEPHONE ENCOUNTER
[facility]/Population Health Chart Review & Patient Outreach Details For Medication Adherence Project    Reason for Outreach Encounter: 3rd Party payor non-compliance report ([employer], [employer], C, etc)  2.  Patient Outreach Method: Reviewed patient chart   3.   Medication in question:  Mounjaro         4.  Reviewed and or Updates Made To: Patient Chart  5. Outreach Outcomes and/or actions taken: Medication discontinued  Additional Notes:   Syncopal episode  Weight loss

## 2025-02-12 ENCOUNTER — PATIENT OUTREACH (OUTPATIENT)
Dept: ADMINISTRATIVE | Facility: HOSPITAL | Age: 65
End: 2025-02-12
Payer: MEDICARE

## 2025-02-26 ENCOUNTER — OFFICE VISIT (OUTPATIENT)
Dept: INTERNAL MEDICINE | Facility: CLINIC | Age: 65
End: 2025-02-26
Payer: MEDICARE

## 2025-02-26 VITALS
TEMPERATURE: 99 F | OXYGEN SATURATION: 96 % | SYSTOLIC BLOOD PRESSURE: 114 MMHG | DIASTOLIC BLOOD PRESSURE: 66 MMHG | RESPIRATION RATE: 14 BRPM | WEIGHT: 190.06 LBS | BODY MASS INDEX: 33.68 KG/M2 | HEART RATE: 86 BPM | HEIGHT: 63 IN

## 2025-02-26 DIAGNOSIS — R05.1 ACUTE COUGH: ICD-10-CM

## 2025-02-26 DIAGNOSIS — U07.1 COVID: Primary | ICD-10-CM

## 2025-02-26 DIAGNOSIS — I10 PRIMARY HYPERTENSION: ICD-10-CM

## 2025-02-26 DIAGNOSIS — U07.1 COVID-19 VIRUS DETECTED: ICD-10-CM

## 2025-02-26 LAB
CTP QC/QA: YES
CTP QC/QA: YES
POC MOLECULAR INFLUENZA A AGN: NEGATIVE
POC MOLECULAR INFLUENZA B AGN: NEGATIVE
SARS-COV-2 RDRP RESP QL NAA+PROBE: POSITIVE

## 2025-02-26 PROCEDURE — 99999 PR PBB SHADOW E&M-EST. PATIENT-LVL III: CPT | Mod: PBBFAC,,, | Performed by: NURSE PRACTITIONER

## 2025-02-26 NOTE — PROGRESS NOTES
"Subjective:       Patient ID: Julio Browne is a 64 y.o. female.    Chief Complaint: Cough (Since Sunday)    History of Present Illness    HPI:  Ms. Browne tested positive for COVID-19 and has had symptoms for approximately 4 days, with onset on Sunday. She reports feeling unwell with frequent coughing and some sneezing. The cough is bothersome and appears to be dry, as she denies producing sputum. She has been taking Mucinex DM (liquid form) for symptom relief, which has been helping to control the cough. She worked for a few hours on Monday and all day on Tuesday while symptomatic. Her condition is tolerable and denies severe symptoms.    She denies fever, chills, shortness of breath, chest pain, sinus pressure, severe congestion, sore throat, and significant body aches.    TEST RESULTS:  - COVID test: positive          HPI    Problem List[1]    Family History   Problem Relation Name Age of Onset    Breast cancer Maternal Grandmother       Past Surgical History:   Procedure Laterality Date    BREAST BIOPSY      EYE SURGERY         Current Medications[2]    Review of Systems   Constitutional:  Positive for fatigue. Negative for chills and fever.   HENT:  Positive for sneezing. Negative for congestion and sore throat.    Respiratory:  Positive for cough. Negative for chest tightness, shortness of breath and wheezing.    Cardiovascular:  Negative for chest pain.   Musculoskeletal:  Negative for myalgias.       Objective:   /66 (BP Location: Left arm, Patient Position: Sitting)   Pulse 86   Temp 98.5 °F (36.9 °C) (Oral)   Resp 14   Ht 5' 3" (1.6 m)   Wt 86.2 kg (190 lb 0.6 oz)   SpO2 96%   BMI 33.66 kg/m²      Physical Exam  Vitals reviewed.   Constitutional:       General: She is not in acute distress.     Appearance: Normal appearance. She is obese. She is not ill-appearing, toxic-appearing or diaphoretic.   HENT:      Head: Normocephalic.      Right Ear: Tympanic membrane and ear canal normal.      Left " Ear: Tympanic membrane and ear canal normal.      Nose: Nose normal. No congestion.      Mouth/Throat:      Mouth: Mucous membranes are moist.      Pharynx: Oropharynx is clear. Posterior oropharyngeal erythema (mild) present.   Eyes:      Conjunctiva/sclera: Conjunctivae normal.   Cardiovascular:      Rate and Rhythm: Normal rate and regular rhythm.      Heart sounds: Normal heart sounds. No murmur heard.  Pulmonary:      Effort: Pulmonary effort is normal. No respiratory distress.      Breath sounds: Normal breath sounds. No wheezing, rhonchi or rales.   Neurological:      Mental Status: She is alert and oriented to person, place, and time.      Coordination: Coordination normal.      Gait: Gait normal.   Psychiatric:         Mood and Affect: Mood normal.         Behavior: Behavior normal.         Assessment & Plan     Results for orders placed or performed in visit on 02/26/25   POCT Influenza A/B Molecular    Collection Time: 02/26/25  9:23 AM   Result Value Ref Range    POC Molecular Influenza A Ag Negative Negative    POC Molecular Influenza B Ag Negative Negative     Acceptable Yes    POCT COVID-19 Rapid Screening    Collection Time: 02/26/25  9:24 AM   Result Value Ref Range    POC Rapid COVID Positive (A) Negative     Acceptable Yes        Assessment & Plan    COVID-19:  - Confirmed positive COVID-19 test result.  - Determined the patient is on day 4 of symptoms  - Performed influenza test, which returned negative.  - Decided against prescribing antivirals due to mild symptoms and stage of illness.  - Discussed typical symptom duration.  - Reviewed quarantine guidelines, recommending mask use for 5 days to minimize transmission.  - Educated about monitoring for exacerbation of symptoms, particularly elevated temperature, dyspnea, or wheezing.  - Advised the patient can return to work tomorrow, while wearing a mask.  - Recommend continuing Guaifenesin/Dextromethorphan (liquid  "form) as needed for cough suppression.  - Instructed to contact the office if symptoms worsen, particularly if developing pyrexia, dyspnea, or wheezing.  - Ms. Browne to stay well hydrated and rest.        1. COVID  Comments:  Reviewed symptomatic care, ER precautions, and CDC quarantine guidelines.    2. Acute cough  -     POCT Influenza A/B Molecular  -     POCT COVID-19 Rapid Screening    3. Primary hypertension  Assessment & Plan:  Stable. BP controlled. Continue amlodipine.          SCOT Kilpatrick    This note was generated with the assistance of ambient listening technology. Verbal consent was obtained by the patient and accompanying visitor(s) for the recording of patient appointment to facilitate this note. I attest to having reviewed and edited the generated note for accuracy, though some syntax or spelling errors may persist. Please contact the author of this note for any clarification.       Portions of this note may have been created with voice recognition software. Occasional "wrong-word" or "sound-a-like" substitutions may have occurred due to the inherent limitations of voice recognition software. Please, read the note carefully and recognize, using context, where substitutions have occurred.             [1]   Patient Active Problem List  Diagnosis    Primary hypertension    Class 1 obesity due to excess calories with body mass index (BMI) of 32.0 to 32.9 in adult   [2]   Current Outpatient Medications:     amLODIPine (NORVASC) 5 MG tablet, Take 1 tablet by mouth once daily, Disp: 90 tablet, Rfl: 2    calcium carbonate-vit D3-min 600 mg calcium- 400 unit Tab, Take 1 tablet by mouth 2 (two) times a day., Disp: 180 tablet, Rfl: 0    rosuvastatin (CRESTOR) 5 MG tablet, Take 1 tablet (5 mg total) by mouth once daily., Disp: 90 tablet, Rfl: 3    "

## 2025-02-26 NOTE — LETTER
February 26, 2025      [facility] Internal Medicine  [address]  [place] [address]  Phone: [phone]  Fax: [phone]       Patient: Julio Browne   YOB: 1960  Date of Visit: 02/26/2025    To Whom It May Concern:    Macey Browne  was at [facility] on 02/26/2025. The patient may return to work/school on 2/27/2025 with no restrictions. If you have any questions or concerns, or if I can be of further assistance, please do not hesitate to contact me.    Sincerely,      SCOT Wise

## 2025-02-26 NOTE — PATIENT INSTRUCTIONS
If symptoms have improved and remain free of fever for at least 24 hours (without the use of fever-reducing medication), isolation can be discontinued. Continue to properly wear a well-fitted mask around others at home and in public for 5 days.    Symptomatic treatment including rest and staying well hydrated. Over-the-counter Tylenol/ibuprofen as needed for fever, body aches, or headaches (if not contraindicated). Over-the-counter cough medication as needed.    Notify clinic or present to ER if develop new onset or worsening shortness of breath, dizziness, chest pain, or confusion.

## 2025-03-13 ENCOUNTER — OFFICE VISIT (OUTPATIENT)
Dept: OPHTHALMOLOGY | Facility: CLINIC | Age: 65
End: 2025-03-13
Payer: MEDICARE

## 2025-03-13 DIAGNOSIS — Z96.1 PSEUDOPHAKIA OF BOTH EYES: Primary | ICD-10-CM

## 2025-03-13 DIAGNOSIS — H52.7 REFRACTIVE ERROR: ICD-10-CM

## 2025-03-13 DIAGNOSIS — H26.493 BILATERAL POSTERIOR CAPSULAR OPACIFICATION: ICD-10-CM

## 2025-03-13 DIAGNOSIS — H35.433 COBBLESTONE RETINAL DEGENERATION, BILATERAL: ICD-10-CM

## 2025-03-13 PROCEDURE — 92004 COMPRE OPH EXAM NEW PT 1/>: CPT | Mod: S$GLB,,, | Performed by: STUDENT IN AN ORGANIZED HEALTH CARE EDUCATION/TRAINING PROGRAM

## 2025-03-13 PROCEDURE — 92015 DETERMINE REFRACTIVE STATE: CPT | Mod: S$GLB,,, | Performed by: STUDENT IN AN ORGANIZED HEALTH CARE EDUCATION/TRAINING PROGRAM

## 2025-03-13 PROCEDURE — 99999 PR PBB SHADOW E&M-EST. PATIENT-LVL I: CPT | Mod: PBBFAC,,, | Performed by: STUDENT IN AN ORGANIZED HEALTH CARE EDUCATION/TRAINING PROGRAM

## 2025-03-13 PROCEDURE — 1159F MED LIST DOCD IN RCRD: CPT | Mod: CPTII,S$GLB,, | Performed by: STUDENT IN AN ORGANIZED HEALTH CARE EDUCATION/TRAINING PROGRAM

## 2025-03-13 NOTE — PROGRESS NOTES
HPI    Vision changes since last eye exam?: Yes   Any eye pain today: No  Other ocular symptoms: No  Last A1C 5.1  Pt use OTC readers, would like a prescribed prescription with reading in   so she wont have to keep removing her glasses.   Last edited by Avinash Boswell on 3/13/2025  2:28 PM.            Assessment /Plan     For exam results, see Encounter Report.    Pseudophakia of both eyes - stable    Bilateral posterior capsular opacification - not visually significant, monitor for now, could do YAG prn    Cobblestone retinal degeneration, bilateral - monitor for now    Refractive error - dispense computer bifocal today      RTC in 2 years or prn for any changes

## 2025-04-06 DIAGNOSIS — I10 PRIMARY HYPERTENSION: ICD-10-CM

## 2025-04-06 NOTE — TELEPHONE ENCOUNTER
No care due was identified.  [facility] Embedded Care Due Messages. Reference number: [id number].   4/06/2025 5:40:32 PM CDT

## 2025-04-07 RX ORDER — AMLODIPINE BESYLATE 5 MG/1
5 TABLET ORAL DAILY
Qty: 90 TABLET | Refills: 1 | Status: SHIPPED | OUTPATIENT
Start: 2025-04-07 | End: 2025-10-04

## 2025-04-14 ENCOUNTER — TELEPHONE (OUTPATIENT)
Dept: INTERNAL MEDICINE | Facility: CLINIC | Age: 65
End: 2025-04-14
Payer: MEDICARE

## 2025-04-14 DIAGNOSIS — I10 PRIMARY HYPERTENSION: ICD-10-CM

## 2025-04-14 RX ORDER — AMLODIPINE BESYLATE 5 MG/1
5 TABLET ORAL 2 TIMES DAILY
Qty: 180 TABLET | Refills: 1 | Status: SHIPPED | OUTPATIENT
Start: 2025-04-14 | End: 2025-10-11

## 2025-04-14 NOTE — TELEPHONE ENCOUNTER
Rec'd message from pharmacy to clarify which script for amlodipine was needed. Message sent to Dr Amanda to clarify.

## 2025-05-20 ENCOUNTER — OFFICE VISIT (OUTPATIENT)
Dept: INTERNAL MEDICINE | Facility: CLINIC | Age: 65
End: 2025-05-20
Payer: MEDICARE

## 2025-05-20 VITALS
HEART RATE: 73 BPM | SYSTOLIC BLOOD PRESSURE: 122 MMHG | OXYGEN SATURATION: 97 % | BODY MASS INDEX: 33.63 KG/M2 | WEIGHT: 189.81 LBS | HEIGHT: 63 IN | DIASTOLIC BLOOD PRESSURE: 72 MMHG | TEMPERATURE: 97 F

## 2025-05-20 DIAGNOSIS — G47.09 OTHER INSOMNIA: Primary | ICD-10-CM

## 2025-05-20 DIAGNOSIS — E66.811 CLASS 1 OBESITY DUE TO EXCESS CALORIES WITH BODY MASS INDEX (BMI) OF 32.0 TO 32.9 IN ADULT, UNSPECIFIED WHETHER SERIOUS COMORBIDITY PRESENT: ICD-10-CM

## 2025-05-20 DIAGNOSIS — I10 PRIMARY HYPERTENSION: ICD-10-CM

## 2025-05-20 DIAGNOSIS — E66.09 CLASS 1 OBESITY DUE TO EXCESS CALORIES WITH BODY MASS INDEX (BMI) OF 32.0 TO 32.9 IN ADULT, UNSPECIFIED WHETHER SERIOUS COMORBIDITY PRESENT: ICD-10-CM

## 2025-05-20 DIAGNOSIS — E78.49 OTHER HYPERLIPIDEMIA: ICD-10-CM

## 2025-05-20 PROCEDURE — 99999 PR PBB SHADOW E&M-EST. PATIENT-LVL III: CPT | Mod: PBBFAC,,, | Performed by: STUDENT IN AN ORGANIZED HEALTH CARE EDUCATION/TRAINING PROGRAM

## 2025-05-20 RX ORDER — DOXEPIN HYDROCHLORIDE 25 MG/1
25 CAPSULE ORAL NIGHTLY PRN
Qty: 30 CAPSULE | Refills: 2 | Status: SHIPPED | OUTPATIENT
Start: 2025-05-20 | End: 2025-08-18

## 2025-05-20 RX ORDER — AMLODIPINE BESYLATE 5 MG/1
5 TABLET ORAL 2 TIMES DAILY
Qty: 180 TABLET | Refills: 1 | Status: SHIPPED | OUTPATIENT
Start: 2025-05-20 | End: 2025-11-16

## 2025-05-20 NOTE — PROGRESS NOTES
Chief Complaint   Patient presents with    Hypertension     HPI: Julio Browne is a 65 y.o. female  with Pmhx listed below who presents to clinic for routine follow up. She reports to be doing well. Denies having any shortness of breath, chest pain, abdominal pain, palpitation, leg swelling, syncopal episode, nausea, vomiting, fever and other complains at present. Medication list has been reviewed and updated along with her. She reports to be compliant with her medication and has no issues taking it.  Her only complain today is inability to fall asleep and stay asleep. She works at a [employer] in [employer] in [place]. Her job requires to wake up 3:45 AM in the morning. She foes to bed around 7:45 but falls asleep around 10 only to wake up in midnight . Following which she cannot go back to work. She does sleep in cool dark room without any stimuli. She denies drinking coffee and drinking sodas. She has tried taking melatonin and benadryl without any effects.         Problem List:  Problem List[1]    ROS: Negative except as noted above.       Current Meds:  Current Medications[2]   PE:  BP: 122/72  Pulse: 73     Temp: 97 °F (36.1 °C)  Weight: 86.1 kg (189 lb 13.1 oz) Body mass index is 33.62 kg/m².    Wt Readings from Last 5 Encounters:   05/20/25 86.1 kg (189 lb 13.1 oz)   02/26/25 86.2 kg (190 lb 0.6 oz)   12/13/24 84.1 kg (185 lb 4.8 oz)   11/18/24 85.1 kg (187 lb 9.8 oz)   10/21/24 85 kg (187 lb 6.3 oz)     General appearance: alert and cooperative, not in acute distress  Head: normocephalic, without obvious abnormality, atraumatic  Eyes: conjunctivae/corneas clear. PERRL, EOM's intact.  Ears: clear tympanic membranes   Neck: no adenopathy, supple, symmetrical, trachea midline and thyroid not enlarged, symmetric, no tenderness/mass/nodules, no JVD  Throat: lips, mucosa, and tongue normal; teeth and gums normal; no thrush  Chest: no reproducible chest pain   Heart: regular rate and rhythm, S1, S2  normal, no murmur, click, rub or gallop  Lungs: unlabored respiration, bilateral equal air entry, normal vesicular breath sound heard, no wheezing, rhonchi   Abdomen: soft, non-tender, non-distended; bowel sounds +; no masses,  no organomegaly, no ascites   Extremities: normal, atraumatic, no cyanosis or edema noted B/L upper and lower extremities.  Skin: skin color, texture, turgor normal. No rashes or lesions noted.  Neurologic: grossly intact      Lab:  Lab Results   Component Value Date    WBC 12.57 12/13/2024    HGB 13.1 12/13/2024    HCT 39.6 12/13/2024    MCV 91 12/13/2024     12/13/2024     12/13/2024    K 3.5 12/13/2024     12/13/2024    CO2 24 12/13/2024    BUN 13 12/13/2024     (H) 12/13/2024    CALCIUM 9.7 12/13/2024    MG 1.7 03/21/2024    AST 20 12/13/2024    ALT 18 12/13/2024    CHOL 221 (H) 10/22/2024    HDL 72 10/22/2024    LDLCALC 127.4 10/22/2024    TRIG 108 10/22/2024    TSH 1.203 03/28/2024       Impression:    ICD-10-CM ICD-9-CM    1. Other insomnia  G47.09 780.52 doxepin (SINEQUAN) 25 MG capsule      2. Other hyperlipidemia  E78.49 272.4       3. Primary hypertension  I10 401.9 amLODIPine (NORVASC) 5 MG tablet        1. Other hyperlipidemia  On Crestor to be continued.    2. Primary hypertension  Low salt diet.  Enouraged to increase in physical activity at least 30 minutes of brisk walking/day , 5 days a week  Advised weight loss    Advised for DASH diet - The Dietary Approaches to Stop Hypertension (DASH) is high in vegetables, fruits, low-fat dairy products, whole grains, poultry, fish, and nuts and low in sweets, sugar-sweetened beverages, and red meats   Stop smoking.  Limit caffeine intake  Limit alcohol intake <3/day for men and <2/day for women.  Advised to be compliant with medication.  Please monitor your blood pressure regularly at home   Return precaution provided    - amLODIPine (NORVASC) 5 MG tablet; Take 1 tablet (5 mg total) by mouth 2 (two) times  daily.  Dispense: 180 tablet; Refill: 1    3. Other insomnia (Primary)    - doxepin (SINEQUAN) 25 MG capsule; Take 1 capsule (25 mg total) by mouth nightly as needed.  Dispense: 30 capsule; Refill: 2    4. Class 1 obesity due to excess calories with body mass index (BMI) of 32.0 to 32.9 in adult, unspecified whether serious comorbidity present  Encourage exercise and dietary modification to loose weight.    No future appointments.    I spent a total of 35 minutes on the day of the visit.This includes face to face time and non-face to face time preparing to see the patient (eg, review of tests), obtaining and/or reviewing separately obtained history, documenting clinical information in the electronic or other health record, independently interpreting results and communicating results to the patient/family/caregiver, or care coordinator.  Visit today included increased complexity associated with the care of the episodic problem   addressed and managing the longitudinal care of the patient due to the serious and/or complex managed problem(s) .     Gianfranco Amanda MD         [1]   Patient Active Problem List  Diagnosis    Primary hypertension    Class 1 obesity due to excess calories with body mass index (BMI) of 32.0 to 32.9 in adult   [2]   Current Outpatient Medications   Medication Sig Dispense Refill    calcium carbonate-vit D3-min 600 mg calcium- 400 unit Tab Take 1 tablet by mouth 2 (two) times a day. 180 tablet 0    rosuvastatin (CRESTOR) 5 MG tablet Take 1 tablet (5 mg total) by mouth once daily. 90 tablet 3    amLODIPine (NORVASC) 5 MG tablet Take 1 tablet (5 mg total) by mouth 2 (two) times daily. 180 tablet 1    doxepin (SINEQUAN) 25 MG capsule Take 1 capsule (25 mg total) by mouth nightly as needed. 30 capsule 2     No current facility-administered medications for this visit.

## 2025-06-01 ENCOUNTER — HOSPITAL ENCOUNTER (EMERGENCY)
Facility: HOSPITAL | Age: 65
Discharge: HOME | End: 2025-06-01
Attending: EMERGENCY MEDICINE
Payer: MEDICARE

## 2025-06-01 VITALS
WEIGHT: 188.5 LBS | DIASTOLIC BLOOD PRESSURE: 80 MMHG | BODY MASS INDEX: 33.39 KG/M2 | TEMPERATURE: 98 F | HEART RATE: 88 BPM | RESPIRATION RATE: 16 BRPM | OXYGEN SATURATION: 100 % | SYSTOLIC BLOOD PRESSURE: 170 MMHG
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
## 2025-06-01 DIAGNOSIS — S62.101A CLOSED FRACTURE OF RIGHT WRIST, INITIAL ENCOUNTER: ICD-10-CM

## 2025-06-01 DIAGNOSIS — S09.90XA INJURY OF HEAD, INITIAL ENCOUNTER: Primary | ICD-10-CM

## 2025-06-01 PROCEDURE — 99284 EMERGENCY DEPT VISIT MOD MDM: CPT | Mod: 25,ER

## 2025-06-01 RX ORDER — HYDROCODONE BITARTRATE AND ACETAMINOPHEN 5; 325 MG/1; MG/1
1 TABLET ORAL EVERY 6 HOURS PRN
Qty: 12 TABLET | Refills: 0 | Status: SHIPPED | OUTPATIENT
Start: 2025-06-01

## 2025-06-01 RX ORDER — IBUPROFEN 800 MG/1
800 TABLET, FILM COATED ORAL EVERY 6 HOURS PRN
Qty: 20 TABLET | Refills: 0 | Status: SHIPPED | OUTPATIENT
Start: 2025-06-01

## 2025-06-01 NOTE — ED PROVIDER NOTES
Encounter Date: 6/1/2025       History     Chief Complaint   Patient presents with    Fall     Tripped on battery charger. Right wrist, left shoulder, face. No LOC. Denies blood thinner.      65-year-old female presents emergency department complaints of right wrist and left shoulder pain.  Patient states she tripped on a battery charger.  Associated symptoms include head injury.  She denies any loss of consciousness, vomiting or any other symptoms    The history is provided by the patient.     Review of patient's allergies indicates:  No Known Allergies  Past Medical History:   Diagnosis Date    Hypertension      Past Surgical History:   Procedure Laterality Date    BREAST BIOPSY      EYE SURGERY       Family History   Problem Relation Name Age of Onset    Breast cancer Maternal Grandmother       Social History[1]  Review of Systems   Constitutional:  Negative for fever.   HENT:  Negative for sore throat.         +head injury   Respiratory:  Negative for shortness of breath.    Cardiovascular:  Negative for chest pain.   Gastrointestinal:  Negative for nausea.   Genitourinary:  Negative for dysuria.   Musculoskeletal:  Positive for arthralgias and joint swelling. Negative for back pain.   Skin:  Negative for rash.   Neurological:  Positive for headaches. Negative for weakness.   Hematological:  Does not bruise/bleed easily.   All other systems reviewed and are negative.      Physical Exam     Initial Vitals [06/01/25 1543]   BP Pulse Resp Temp SpO2   (!) 187/82 92 18 98.2 °F (36.8 °C) 97 %      MAP       --         Physical Exam    Constitutional: She appears well-developed and well-nourished. She is not diaphoretic. No distress.   HENT:   Head: Normocephalic. Head is with contusion.       Eyes: Conjunctivae and EOM are normal. Pupils are equal, round, and reactive to light.   Neck: Neck supple.   Normal range of motion.  Cardiovascular:  Normal rate, regular rhythm and normal heart sounds.           No murmur  heard.  Pulmonary/Chest: Breath sounds normal. No respiratory distress. She has no wheezes. She has no rales.   Abdominal: Abdomen is soft. Bowel sounds are normal. There is no abdominal tenderness. There is no rebound and no guarding.   Musculoskeletal:         General: No edema.      Left shoulder: Tenderness present.      Right wrist: Swelling and tenderness present. Decreased range of motion.      Cervical back: Normal range of motion and neck supple.     Neurological: She is alert and oriented to person, place, and time. No cranial nerve deficit. GCS score is 15. GCS eye subscore is 4. GCS verbal subscore is 5. GCS motor subscore is 6.   Skin: Skin is warm and dry. Capillary refill takes less than 2 seconds.   Psychiatric: She has a normal mood and affect. Thought content normal.         ED Course   Procedures  Labs Reviewed - No data to display       Imaging Results              X-Ray Wrist Complete Right (Final result)  Result time 06/01/25 17:20:26      Final result by Bryan Manzo MD (06/01/25 17:20:26)                   Impression:      As above.    Finalized on: 6/1/2025 5:20 PM By:  Bryan Manzo MD  [facility]# [id number]      2025-06-01 17:22:30.719     [facility]               Narrative:      EXAM:  XR WRIST COMPLETE 3 VIEWS RIGHT    CLINICAL HISTORY: Right wrist pain.    FINDINGS: Intra-articular fracture along the ulnar aspect of the distal radius.                                         X-Ray Shoulder 2 or More Views Left (Final result)  Result time 06/01/25 17:20:19      Final result by Sunil Akers MD (06/01/25 17:20:19)                   Impression:      No acute findings.  Mild acromioclavicular degenerative changes.    Finalized on: 6/1/2025 5:20 PM By:  Sunil Akers MD  [facility]# [id number]      2025-06-01 17:22:20.715     [facility]               Narrative:    EXAM: XR SHOULDER COMPLETE 2 OR MORE VIEWS LEFT    CLINICAL HISTORY: left shoulder pain;    TECHNIQUE: Internal rotation, external  rotation, and scapular Y views    COMPARISON: X-ray dated 09/08/2023    FINDINGS:  There is no acute fracture or dislocation. Bone mineralization is normal. No aggressive lytic or blastic lesion. No osseous erosion or aggressive periosteal reaction seen. Mild acromioclavicular degenerative spurring.  Normal joint alignment appears well-maintained. Soft tissues are unremarkable.                                         CT Head Without Contrast (Final result)  Result time 06/01/25 16:56:18      Final result by Conner Oshea MD (06/01/25 16:56:18)                   Impression:      1. No acute process seen.  Recommend follow-up if symptoms persist      All CT scans at [this location] are performed using dose modulation techniques as appropriate to a performed exam including the following: automated exposure control; adjustment of the mA and/or kV according to patient size (this includes techniques or standardized protocols for targeted exams where dose is matched to indication / reason for exam; i.e. extremities or head); use of iterative reconstruction technique.      Finalized on: 6/1/2025 4:56 PM By:  Conner Oshea MD ANGELINA PhD  [facility]# [id number]      2025-06-01 16:58:19.445     [facility]               Narrative:    EXAM: CT HEAD WITHOUT CONTRAST    CLINICAL HISTORY: Pain    TECHNIQUE: Contiguous axial images were obtained from the skull base through the vertex without intravenous contrast.    COMPARISON: None available.    FINDINGS: No intracranial hemorrhage mass effect or midline shift.   No extra axial fluid collections.    The ventricles and sulci are unremarkable for age.  There is no evidence of hydrocephalus.    The paranasal sinuses and mastoid air cells are clear.  No fractures are identified.  No concerning osseous lesions.                                         Medications - No data to display  Medical Decision Making  Amount and/or Complexity of Data Reviewed  Radiology: ordered.    Risk  Prescription drug  management.                                      Clinical Impression:  Final diagnoses:  [W19.XXXA] Fall  [S09.90XA] Injury of head, initial encounter (Primary)  [S62.101A] Closed fracture of right wrist, initial encounter          ED Disposition Condition    Discharge Stable          ED Prescriptions       Medication Sig Dispense Start Date End Date Auth. Provider    ibuprofen (ADVIL,MOTRIN) 800 MG tablet Take 1 tablet (800 mg total) by mouth every 6 (six) hours as needed for Pain. 20 tablet 6/1/2025 -- Hector Frederick Jr., FNP    HYDROcodone-acetaminophen (NORCO) 5-325 mg per tablet Take 1 tablet by mouth every 6 (six) hours as needed for Pain. 12 tablet 6/1/2025 -- Hector Frederick Jr., FNP          Follow-up Information       Follow up With Specialties Details Why Contact Info    Gianfranco Amanda MD Internal Medicine, Family Medicine In 1 week  [address] [address] [id number] [phone]                     [1]   Social History  Tobacco Use    Smoking status: Never    Smokeless tobacco: Never   Substance Use Topics    Alcohol use: Yes     Comment: seldom    Drug use: Never        Hector Frederick Jr., FNP  06/01/25 3592

## 2025-06-01 NOTE — Clinical Note
"Julio"Den Browne was seen and treated in our emergency department on 6/1/2025.  She may return to work on 06/04/2025.       If you have any questions or concerns, please don't hesitate to call.      Hector Frederick Jr., FNP"

## 2025-06-02 ENCOUNTER — TELEPHONE (OUTPATIENT)
Dept: ORTHOPEDICS | Facility: CLINIC | Age: 65
End: 2025-06-02
Payer: MEDICARE

## 2025-06-04 ENCOUNTER — OFFICE VISIT (OUTPATIENT)
Dept: ORTHOPEDICS | Facility: CLINIC | Age: 65
End: 2025-06-04
Payer: MEDICARE

## 2025-06-04 VITALS
DIASTOLIC BLOOD PRESSURE: 62 MMHG | WEIGHT: 185.63 LBS | SYSTOLIC BLOOD PRESSURE: 123 MMHG | BODY MASS INDEX: 32.89 KG/M2 | HEART RATE: 72 BPM | HEIGHT: 63 IN

## 2025-06-04 DIAGNOSIS — S52.501A CLOSED FRACTURE OF DISTAL END OF RIGHT RADIUS, UNSPECIFIED FRACTURE MORPHOLOGY, INITIAL ENCOUNTER: Primary | ICD-10-CM

## 2025-06-04 DIAGNOSIS — M25.512 ACUTE PAIN OF LEFT SHOULDER: ICD-10-CM

## 2025-06-04 PROCEDURE — 1100F PTFALLS ASSESS-DOCD GE2>/YR: CPT | Mod: CPTII,S$GLB,, | Performed by: ORTHOPAEDIC SURGERY

## 2025-06-04 PROCEDURE — 99999 PR PBB SHADOW E&M-EST. PATIENT-LVL III: CPT | Mod: PBBFAC,,, | Performed by: ORTHOPAEDIC SURGERY

## 2025-06-04 PROCEDURE — 1159F MED LIST DOCD IN RCRD: CPT | Mod: CPTII,S$GLB,, | Performed by: ORTHOPAEDIC SURGERY

## 2025-06-04 PROCEDURE — 3074F SYST BP LT 130 MM HG: CPT | Mod: CPTII,S$GLB,, | Performed by: ORTHOPAEDIC SURGERY

## 2025-06-04 PROCEDURE — 3288F FALL RISK ASSESSMENT DOCD: CPT | Mod: CPTII,S$GLB,, | Performed by: ORTHOPAEDIC SURGERY

## 2025-06-04 PROCEDURE — 3008F BODY MASS INDEX DOCD: CPT | Mod: CPTII,S$GLB,, | Performed by: ORTHOPAEDIC SURGERY

## 2025-06-04 PROCEDURE — 99204 OFFICE O/P NEW MOD 45 MIN: CPT | Mod: S$GLB,,, | Performed by: ORTHOPAEDIC SURGERY

## 2025-06-04 PROCEDURE — 3078F DIAST BP <80 MM HG: CPT | Mod: CPTII,S$GLB,, | Performed by: ORTHOPAEDIC SURGERY

## 2025-06-04 PROCEDURE — 97760 ORTHOTIC MGMT&TRAING 1ST ENC: CPT | Mod: S$GLB,,, | Performed by: ORTHOPAEDIC SURGERY

## 2025-06-04 PROCEDURE — 1125F AMNT PAIN NOTED PAIN PRSNT: CPT | Mod: CPTII,S$GLB,, | Performed by: ORTHOPAEDIC SURGERY

## 2025-06-04 PROCEDURE — 1160F RVW MEDS BY RX/DR IN RCRD: CPT | Mod: CPTII,S$GLB,, | Performed by: ORTHOPAEDIC SURGERY

## 2025-06-26 ENCOUNTER — OFFICE VISIT (OUTPATIENT)
Dept: INTERNAL MEDICINE | Facility: CLINIC | Age: 65
End: 2025-06-26
Payer: MEDICARE

## 2025-06-26 VITALS
RESPIRATION RATE: 18 BRPM | SYSTOLIC BLOOD PRESSURE: 128 MMHG | HEART RATE: 82 BPM | HEIGHT: 63 IN | OXYGEN SATURATION: 97 % | TEMPERATURE: 99 F | BODY MASS INDEX: 33.6 KG/M2 | DIASTOLIC BLOOD PRESSURE: 69 MMHG | WEIGHT: 189.63 LBS

## 2025-06-26 DIAGNOSIS — Z00.00 ENCOUNTER FOR MEDICARE ANNUAL WELLNESS EXAM: Primary | ICD-10-CM

## 2025-06-26 DIAGNOSIS — E66.811 CLASS 1 OBESITY DUE TO EXCESS CALORIES WITH SERIOUS COMORBIDITY AND BODY MASS INDEX (BMI) OF 33.0 TO 33.9 IN ADULT: ICD-10-CM

## 2025-06-26 DIAGNOSIS — I10 PRIMARY HYPERTENSION: ICD-10-CM

## 2025-06-26 DIAGNOSIS — E66.09 CLASS 1 OBESITY DUE TO EXCESS CALORIES WITH SERIOUS COMORBIDITY AND BODY MASS INDEX (BMI) OF 33.0 TO 33.9 IN ADULT: ICD-10-CM

## 2025-06-26 DIAGNOSIS — Z87.81 HISTORY OF FRACTURE: ICD-10-CM

## 2025-06-26 DIAGNOSIS — Z78.0 POSTMENOPAUSAL: ICD-10-CM

## 2025-06-26 PROCEDURE — 99999 PR PBB SHADOW E&M-EST. PATIENT-LVL IV: CPT | Mod: PBBFAC,,, | Performed by: NURSE PRACTITIONER

## 2025-06-26 NOTE — PATIENT INSTRUCTIONS
Counseling and Referral of Other Preventative  (Italic type indicates deductible and co-insurance are waived)    Patient Name: Julio Browne  Today's Date: 6/26/2025    Health Maintenance       Date Due Completion Date    TETANUS VACCINE Never done ---    RSV Vaccine (Age 60+ and Pregnant patients) (1 - Risk 60-74 years 1-dose series) Never done ---    DEXA Scan 10/13/2023 4/13/2023    COVID-19 Vaccine (4 - 2024-25 season) 09/01/2024 12/22/2021    Influenza Vaccine (Season Ended) 09/01/2025 ---    Mammogram 10/22/2025 10/22/2024    Colorectal Cancer Screening 04/23/2026 4/23/2023    Hemoglobin A1c (Diabetic Prevention Screening) 10/22/2027 10/22/2024    Lipid Panel 10/22/2029 10/22/2024        Orders Placed This Encounter   Procedures    DXA Bone Density Axial Skeleton 1 or more sites     The following information is provided to all patients.  This information is to help you find resources for any of the problems found today that may be affecting your health:                  Living healthy guide: www.[facility].[place].gov      Understanding Diabetes: www.diabetes.org      Eating healthy: www.cdc.gov/healthyweight      CDC home safety checklist: www.cdc.gov/steadi/patient.html      Agency on Aging: www.goea.[place].gov      Alcoholics anonymous (AA): www.aa.org      Physical Activity: www.lisa.nih.gov/oe3ijls      Tobacco use: www.quitwithusla.org

## 2025-06-26 NOTE — PROGRESS NOTES
"Julio Browne presented for a follow-up Medicare AWV today. The following components were reviewed and updated:    Medical history  Family History  Social history  Allergies and Current Medications  Health Risk Assessment  Health Maintenance  Care Team    **See Completed Assessments for Annual Wellness visit with in the encounter summary    The following assessments were completed:  Depression Screening  Cognitive function Screening  Timed Get Up Test  Whisper Test      Opioid documentation:      Patient does not have a current opioid prescription.          Vitals:    06/26/25 1339   BP: 128/69   Pulse: 82   Resp: 18   Temp: 98.6 °F (37 °C)   TempSrc: Tympanic   SpO2: 97%   Weight: 86 kg (189 lb 9.5 oz)   Height: 5' 3" (1.6 m)     Body mass index is 33.59 kg/m².       Physical Exam  Vitals reviewed.   Constitutional:       General: She is not in acute distress.     Appearance: Normal appearance. She is obese. She is not ill-appearing, toxic-appearing or diaphoretic.   HENT:      Head: Normocephalic.   Eyes:      Conjunctiva/sclera: Conjunctivae normal.   Cardiovascular:      Rate and Rhythm: Normal rate.   Pulmonary:      Effort: Pulmonary effort is normal. No respiratory distress.   Musculoskeletal:      Comments: Brace to right wrist   Skin:     Findings: No bruising.   Neurological:      Mental Status: She is alert and oriented to person, place, and time.      Coordination: Coordination normal.      Gait: Gait normal.   Psychiatric:         Mood and Affect: Mood normal.         Behavior: Behavior normal.         Diagnoses and health risks identified today and associated recommendations/orders:  1. Encounter for Medicare annual wellness exam  -     Referral to Enhanced Annual Wellness Visit (eAWV) W+1    No concerning findings on exam. Discussed health maintenance due including tetanus, RSV, and COVID vaccines which can be completed at pharmacy. She is due for Dexa scan. Dexa ordered, defers completing today. Follow " up appointment with PCP scheduled      2. Primary hypertension  Assessment & Plan:  Stable. BP controlled. Continue amlodipine.      3. Class 1 obesity due to excess calories with serious comorbidity and body mass index (BMI) of 33.0 to 33.9 in adult  Assessment & Plan:  Counseled on importance of diet and exercise. Encouraged patient to have an active lifestyle with regular exercise with healthy eating.         4. Postmenopausal  -     DXA Bone Density Axial Skeleton 1 or more sites; Future; Expected date: 06/26/2025    5. History of fracture  -     DXA Bone Density Axial Skeleton 1 or more sites; Future; Expected date: 06/26/2025        Provided Julio with a 5-10 year written screening schedule and personal prevention plan. Recommendations were developed using the USPSTF age appropriate recommendations. Education, counseling, and referrals were provided as needed.  After Visit Summary printed and given to patient which includes a list of additional screenings\tests needed.    Follow up if symptoms worsen or fail to improve.      Nidia Eduardo, HIRENP-C      I offered to discuss advanced care planning, including how to pick a person who would make decisions for you if you were unable to make them for yourself, called a health care power of , and what kind of decisions you might make such as use of life sustaining treatments such as ventilators and tube feeding when faced with a life limiting illness recorded on a living will that they will need to know. (How you want to be cared for as you near the end of your natural life)     X  Patient is unwilling to engage in a discussion regarding advance directives at this time.

## 2025-06-26 NOTE — ASSESSMENT & PLAN NOTE
Counseled on importance of diet and exercise. Encouraged patient to have an active lifestyle with regular exercise with healthy eating.

## 2025-08-11 DIAGNOSIS — G47.09 OTHER INSOMNIA: ICD-10-CM

## 2025-08-11 RX ORDER — DOXEPIN HYDROCHLORIDE 25 MG/1
25 CAPSULE ORAL NIGHTLY PRN
Qty: 30 CAPSULE | Refills: 0 | Status: SHIPPED | OUTPATIENT
Start: 2025-08-11

## 2025-08-20 ENCOUNTER — PATIENT OUTREACH (OUTPATIENT)
Dept: ADMINISTRATIVE | Facility: HOSPITAL | Age: 65
End: 2025-08-20
Payer: MEDICARE